# Patient Record
Sex: FEMALE | Race: WHITE | Employment: OTHER | URBAN - METROPOLITAN AREA
[De-identification: names, ages, dates, MRNs, and addresses within clinical notes are randomized per-mention and may not be internally consistent; named-entity substitution may affect disease eponyms.]

---

## 2021-08-30 ENCOUNTER — OFFICE VISIT (OUTPATIENT)
Dept: PRIMARY CARE CLINIC | Age: 71
End: 2021-08-30
Payer: MEDICARE

## 2021-08-30 VITALS
BODY MASS INDEX: 27.36 KG/M2 | DIASTOLIC BLOOD PRESSURE: 82 MMHG | HEIGHT: 63 IN | OXYGEN SATURATION: 96 % | SYSTOLIC BLOOD PRESSURE: 140 MMHG | HEART RATE: 69 BPM | WEIGHT: 154.4 LBS

## 2021-08-30 DIAGNOSIS — I63.9 CEREBROVASCULAR ACCIDENT (CVA), UNSPECIFIED MECHANISM (HCC): ICD-10-CM

## 2021-08-30 DIAGNOSIS — Z76.89 ENCOUNTER TO ESTABLISH CARE: ICD-10-CM

## 2021-08-30 DIAGNOSIS — Z00.00 HEALTH CARE MAINTENANCE: ICD-10-CM

## 2021-08-30 DIAGNOSIS — Z78.0 MENOPAUSE: ICD-10-CM

## 2021-08-30 DIAGNOSIS — E78.5 HYPERLIPIDEMIA, UNSPECIFIED HYPERLIPIDEMIA TYPE: ICD-10-CM

## 2021-08-30 DIAGNOSIS — R94.31 ABNORMAL ELECTROCARDIOGRAM (ECG) (EKG): ICD-10-CM

## 2021-08-30 DIAGNOSIS — Z12.31 ENCOUNTER FOR SCREENING MAMMOGRAM FOR MALIGNANT NEOPLASM OF BREAST: Primary | ICD-10-CM

## 2021-08-30 DIAGNOSIS — R79.9 ABNORMAL FINDING OF BLOOD CHEMISTRY, UNSPECIFIED: ICD-10-CM

## 2021-08-30 DIAGNOSIS — Z12.11 SCREEN FOR COLON CANCER: ICD-10-CM

## 2021-08-30 DIAGNOSIS — M54.2 NECK PAIN: ICD-10-CM

## 2021-08-30 DIAGNOSIS — Z91.81 AT HIGH RISK FOR FALLS: ICD-10-CM

## 2021-08-30 DIAGNOSIS — I71.9 AORTIC ANEURYSM WITHOUT RUPTURE, UNSPECIFIED PORTION OF AORTA (HCC): ICD-10-CM

## 2021-08-30 PROCEDURE — 1090F PRES/ABSN URINE INCON ASSESS: CPT | Performed by: PHYSICIAN ASSISTANT

## 2021-08-30 PROCEDURE — 99204 OFFICE O/P NEW MOD 45 MIN: CPT | Performed by: PHYSICIAN ASSISTANT

## 2021-08-30 PROCEDURE — 3017F COLORECTAL CA SCREEN DOC REV: CPT | Performed by: PHYSICIAN ASSISTANT

## 2021-08-30 PROCEDURE — 1123F ACP DISCUSS/DSCN MKR DOCD: CPT | Performed by: PHYSICIAN ASSISTANT

## 2021-08-30 PROCEDURE — G8400 PT W/DXA NO RESULTS DOC: HCPCS | Performed by: PHYSICIAN ASSISTANT

## 2021-08-30 PROCEDURE — G8417 CALC BMI ABV UP PARAM F/U: HCPCS | Performed by: PHYSICIAN ASSISTANT

## 2021-08-30 PROCEDURE — G8427 DOCREV CUR MEDS BY ELIG CLIN: HCPCS | Performed by: PHYSICIAN ASSISTANT

## 2021-08-30 PROCEDURE — 4040F PNEUMOC VAC/ADMIN/RCVD: CPT | Performed by: PHYSICIAN ASSISTANT

## 2021-08-30 PROCEDURE — 4004F PT TOBACCO SCREEN RCVD TLK: CPT | Performed by: PHYSICIAN ASSISTANT

## 2021-08-30 RX ORDER — LANOLIN ALCOHOL/MO/W.PET/CERES
1000 CREAM (GRAM) TOPICAL DAILY
COMMUNITY

## 2021-08-30 RX ORDER — CYCLOBENZAPRINE HCL 10 MG
10 TABLET ORAL 3 TIMES DAILY PRN
Qty: 30 TABLET | Refills: 1 | Status: SHIPPED | OUTPATIENT
Start: 2021-08-30 | End: 2021-08-31 | Stop reason: SDUPTHER

## 2021-08-30 RX ORDER — AMLODIPINE BESYLATE 5 MG/1
5 TABLET ORAL DAILY
COMMUNITY
End: 2021-10-12 | Stop reason: SDUPTHER

## 2021-08-30 RX ORDER — ATORVASTATIN CALCIUM 10 MG/1
TABLET, FILM COATED ORAL
COMMUNITY
Start: 2021-08-07 | End: 2021-09-24 | Stop reason: ALTCHOICE

## 2021-08-30 RX ORDER — OMEPRAZOLE 40 MG/1
CAPSULE, DELAYED RELEASE ORAL
COMMUNITY
Start: 2021-08-07 | End: 2022-05-20 | Stop reason: SDUPTHER

## 2021-08-30 SDOH — ECONOMIC STABILITY: FOOD INSECURITY: WITHIN THE PAST 12 MONTHS, THE FOOD YOU BOUGHT JUST DIDN'T LAST AND YOU DIDN'T HAVE MONEY TO GET MORE.: NEVER TRUE

## 2021-08-30 SDOH — ECONOMIC STABILITY: FOOD INSECURITY: WITHIN THE PAST 12 MONTHS, YOU WORRIED THAT YOUR FOOD WOULD RUN OUT BEFORE YOU GOT MONEY TO BUY MORE.: NEVER TRUE

## 2021-08-30 ASSESSMENT — ENCOUNTER SYMPTOMS
CHEST TIGHTNESS: 0
SORE THROAT: 0
ABDOMINAL PAIN: 0
COUGH: 0
EYE DISCHARGE: 0
DIARRHEA: 0
CONSTIPATION: 0
PHOTOPHOBIA: 0
ABDOMINAL DISTENTION: 0
RHINORRHEA: 0
SINUS PRESSURE: 0
SHORTNESS OF BREATH: 0
VOMITING: 0

## 2021-08-30 ASSESSMENT — SOCIAL DETERMINANTS OF HEALTH (SDOH): HOW HARD IS IT FOR YOU TO PAY FOR THE VERY BASICS LIKE FOOD, HOUSING, MEDICAL CARE, AND HEATING?: NOT HARD AT ALL

## 2021-08-30 ASSESSMENT — PATIENT HEALTH QUESTIONNAIRE - PHQ9
SUM OF ALL RESPONSES TO PHQ QUESTIONS 1-9: 0
SUM OF ALL RESPONSES TO PHQ QUESTIONS 1-9: 0
2. FEELING DOWN, DEPRESSED OR HOPELESS: 0
SUM OF ALL RESPONSES TO PHQ QUESTIONS 1-9: 0
SUM OF ALL RESPONSES TO PHQ9 QUESTIONS 1 & 2: 0
1. LITTLE INTEREST OR PLEASURE IN DOING THINGS: 0

## 2021-08-30 NOTE — PROGRESS NOTES
On the basis of positive falls risk screening, assessment and plan is as follows: home safety tips provided       [address] [address]  Dept: [phone]    Yajaira Kohli is a 80 y.o. female New patient, who presents today for her medical conditions/complaints as noted below. Chief Complaint   Patient presents with    New Patient     get established        HPI:     HPI: The patient is having a stiff neck. She can feel muscles tighten. Has tried Bengay heating pad and pain cream.     Had kidney cancer. Had uterus cancer. No oncologist. The patient did not need chemo. Kidney was on 11-8-05 hysterectomy in 1979. Patient has chronic back pain. Handling it on her. Reviewed prior notes Cardiology  Reviewed previous Labs and Imaging    No results found for: LDLCHOLESTEROL, LDLCALC    (goal LDL is <100)   AST (U/L)   Date Value   01/09/2014 21     ALT (U/L)   Date Value   01/09/2014 13     BUN (mg/dL)   Date Value   01/11/2014 12     BP Readings from Last 3 Encounters:   08/30/21 (!) 140/82   01/30/14 142/89   11/08/13 135/79          (goal 120/80)    Past Medical History:   Diagnosis Date    Arthritic-like pain     Arthritis     CAD (coronary artery disease)     Cancer (HCC)     uterus,lt kidney    Chest pain 1/8/14    Chronic kidney disease     DVT (deep venous thrombosis) (HCC)     Rt. upper arm    Full dentures     GERD (gastroesophageal reflux disease)     Lazy eye     Rt.     Osteoporosis     Peptic ulcer     PFO (patent foramen ovale) 1/10/14    closure of PFO    Stroke syndrome     x4 sept 9/13 last one    TIA (transient ischemic attack)     Wears glasses       Past Surgical History:   Procedure Laterality Date    BACK SURGERY      fussion L5    CARDIAC CATHETERIZATION      CARPAL TUNNEL RELEASE      HAND SURGERY Right     thumb rt    HYSTERECTOMY      PARTIAL NEPHRECTOMY      SPLENECTOMY, TOTAL  THORACOTOMY  1/10/14     min. Inv. PFO closure       Family History   Problem Relation Age of Onset    Diabetes Mother     Asthma Mother     Heart Failure Mother         MI    Heart Failure Father         pacemaker    Rheum Arthritis Father     Diabetes Sister     Diabetes Sister     Diabetes Brother     Cancer Sister         hyst    Heart Failure Brother         MI       Social History     Tobacco Use    Smoking status: Current Every Day Smoker     Packs/day: 1.00     Years: 48.00     Pack years: 48.00     Types: Cigarettes    Smokeless tobacco: Never Used    Tobacco comment: trying to quit, down to 3-4 cig./day   Substance Use Topics    Alcohol use: No      Current Outpatient Medications   Medication Sig Dispense Refill    atorvastatin (LIPITOR) 10 MG tablet       omeprazole (PRILOSEC) 40 MG delayed release capsule       amLODIPine (NORVASC) 5 MG tablet Take 5 mg by mouth daily      vitamin B-12 (CYANOCOBALAMIN) 1000 MCG tablet Take 1,000 mcg by mouth daily      Cholecalciferol (VITAMIN D3 PO) Take 1,000 mg by mouth      cyclobenzaprine (FLEXERIL) 10 MG tablet Take 1 tablet by mouth 3 times daily as needed for Muscle spasms 30 tablet 1    clopidogrel (PLAVIX) 75 MG tablet Take 75 mg by mouth daily. No current facility-administered medications for this visit.      Allergies   Allergen Reactions    Adhesive Tape        Health Maintenance   Topic Date Due    Hepatitis C screen  Never done    Meningococcal (ACWY) vaccine (1 - Risk start before 7 months 4-dose series) Never done    Hib vaccine (1 of 1 - Risk 1-dose series) Never done    Lipid screen  Never done    Meningococcal B vaccine (1 of 4 - Increased Risk Bexsero 2-dose series) Never done    DTaP/Tdap/Td vaccine (1 - Tdap) Never done    Diabetes screen  Never done    Colon cancer screen colonoscopy  Never done    Breast cancer screen  Never done    Shingles Vaccine (1 of 2) Never done    Low dose CT lung screening  Never done    DEXA (modify frequency per FRAX score)  Never done    Pneumococcal 65+ yrs at Risk Vaccine (1 of 2 - PCV13) Never done    Flu vaccine (1) 09/01/2021    COVID-19 Vaccine  Completed    Hepatitis A vaccine  Aged Out    Hepatitis B vaccine  Aged Out       Subjective:      Review of Systems   Constitutional: Negative for chills, fever and unexpected weight change. HENT: Negative for congestion, hearing loss, rhinorrhea, sinus pressure and sore throat. Eyes: Negative for photophobia, discharge and visual disturbance. Respiratory: Negative for cough, chest tightness and shortness of breath. Cardiovascular: Negative for chest pain, palpitations and leg swelling. Gastrointestinal: Negative for abdominal distention, abdominal pain, constipation, diarrhea and vomiting. Endocrine: Negative for polydipsia and polyuria. Genitourinary: Negative for decreased urine volume, difficulty urinating, frequency and urgency. Musculoskeletal: Negative for arthralgias, gait problem and myalgias. Skin: Negative for rash. Allergic/Immunologic: Negative for food allergies. Neurological: Negative for dizziness, weakness, numbness and headaches. Hematological: Negative for adenopathy. Psychiatric/Behavioral: Negative for dysphoric mood and sleep disturbance. The patient is not nervous/anxious. Objective:     BP (!) 140/82   Pulse 69   Ht 5' 2.5\" (1.588 m)   Wt 154 lb 6.4 oz (70 kg)   SpO2 96%   BMI 27.79 kg/m²   Physical Exam  Constitutional:       General: She is not in acute distress. Appearance: Normal appearance. She is not ill-appearing. HENT:      Head: Normocephalic and atraumatic. Right Ear: External ear normal.      Left Ear: External ear normal.      Nose: Nose normal.      Mouth/Throat:      Mouth: Mucous membranes are moist.   Eyes:      Extraocular Movements: Extraocular movements intact.       Conjunctiva/sclera: Conjunctivae normal.      Pupils: Pupils are equal, round, and reactive to light. Neck:      Vascular: No carotid bruit. Cardiovascular:      Rate and Rhythm: Normal rate and regular rhythm. Pulses: Normal pulses. Heart sounds: Normal heart sounds. Pulmonary:      Effort: Pulmonary effort is normal. No respiratory distress. Breath sounds: Normal breath sounds. Abdominal:      General: Bowel sounds are normal. There is no distension. Tenderness: There is no abdominal tenderness. Musculoskeletal:         General: Normal range of motion. Cervical back: Normal range of motion and neck supple. Lymphadenopathy:      Cervical: No cervical adenopathy. Skin:     General: Skin is warm and dry. Neurological:      General: No focal deficit present. Mental Status: She is alert and oriented to person, place, and time. Psychiatric:         Mood and Affect: Mood normal.         Behavior: Behavior normal.         Thought Content: Thought content normal.         Assessment and Plan:          1. Encounter for screening mammogram for malignant neoplasm of breast  2. Screen for colon cancer  3. Encounter to establish care  -     Lipid, Fasting; Future  -     Comprehensive Metabolic Panel; Future  4. Health care maintenance  -     Lipid, Fasting; Future  -     Comprehensive Metabolic Panel; Future  -     CBC Auto Differential; Future  5. Menopause  6. At high risk for falls  7. Neck pain  -     cyclobenzaprine (FLEXERIL) 10 MG tablet; Take 1 tablet by mouth 3 times daily as needed for Muscle spasms, Disp-30 tablet, R-1Normal  8. Aortic aneurysm without rupture, unspecified portion of aorta (HCC)  -     Echocardiogram complete; Future  9. Abnormal finding of blood chemistry, unspecified   -     Lipid, Fasting; Future  -     Comprehensive Metabolic Panel; Future  10. Abnormal electrocardiogram (ECG) (EKG)   -     Echocardiogram complete; Future  11. Cerebrovascular accident (CVA), unspecified mechanism ([address]   No vaccines wanted.    No flu shot.   Needs record release from Dr. Doris Mane in . The patient will start baby aspirin. Patient given educational materials - see patient instructions. Discussed use, benefit, and side effects of prescribed medications. All patient questions answered. Pt voiced understanding. Reviewed health maintenance. Instructed to continue current medications, diet and exercise. Patient agreed with treatment plan. Follow up as directed.      Electronically signed by DAPHNIE Monson on 8/30/2021 at 10:50 AM  .

## 2021-08-31 ENCOUNTER — TELEPHONE (OUTPATIENT)
Dept: PRIMARY CARE CLINIC | Age: 71
End: 2021-08-31

## 2021-08-31 DIAGNOSIS — M54.2 NECK PAIN: ICD-10-CM

## 2021-08-31 RX ORDER — CYCLOBENZAPRINE HCL 10 MG
10 TABLET ORAL 3 TIMES DAILY PRN
Qty: 30 TABLET | Refills: 1 | Status: SHIPPED | OUTPATIENT
Start: 2021-08-31 | End: 2021-10-12 | Stop reason: SDUPTHER

## 2021-08-31 NOTE — TELEPHONE ENCOUNTER
----- Message from Roel Blum sent at 8/31/2021  8:17 AM EDT -----  Subject: Medication Problem    QUESTIONS  Name of Medication? cyclobenzaprine (FLEXERIL) 10 MG tablet  Patient-reported dosage and instructions? Take 1 tablet by mouth 3 times   daily as needed for Muscle spasms  What question or problem do you have with the medication? Patient says   these never arrived at her pharmacy after her appointment on 8/30/2021. Preferred Pharmacy? [facility], [address] [facility] [phone]  Pharmacy phone number (if available)? [phone]  Additional Information for Provider?   ---------------------------------------------------------------------------  --------------  [address]  What is the best way for the office to contact you? OK to leave message on   voicemail  Preferred Call Back Phone Number? [phone]  ---------------------------------------------------------------------------  --------------  SCRIPT ANSWERS  Relationship to Patient?  Self

## 2021-09-17 ENCOUNTER — PROCEDURE VISIT (OUTPATIENT)
Dept: PRIMARY CARE CLINIC | Age: 71
End: 2021-09-17
Payer: MEDICARE

## 2021-09-17 ENCOUNTER — TELEPHONE (OUTPATIENT)
Dept: PRIMARY CARE CLINIC | Age: 71
End: 2021-09-17

## 2021-09-17 VITALS
SYSTOLIC BLOOD PRESSURE: 142 MMHG | HEIGHT: 63 IN | WEIGHT: 158 LBS | DIASTOLIC BLOOD PRESSURE: 80 MMHG | BODY MASS INDEX: 28 KG/M2 | HEART RATE: 64 BPM | OXYGEN SATURATION: 96 %

## 2021-09-17 DIAGNOSIS — D48.5 NEOPLASM OF UNCERTAIN BEHAVIOR OF SKIN: ICD-10-CM

## 2021-09-17 DIAGNOSIS — D18.01 CHERRY ANGIOMA: ICD-10-CM

## 2021-09-17 DIAGNOSIS — L81.4 LENTIGINES: ICD-10-CM

## 2021-09-17 DIAGNOSIS — L82.1 SEBORRHEIC KERATOSES: ICD-10-CM

## 2021-09-17 DIAGNOSIS — I78.1 TELANGIECTASIAS: ICD-10-CM

## 2021-09-17 DIAGNOSIS — M54.2 NECK PAIN: Primary | ICD-10-CM

## 2021-09-17 PROCEDURE — 3017F COLORECTAL CA SCREEN DOC REV: CPT | Performed by: PHYSICIAN ASSISTANT

## 2021-09-17 PROCEDURE — 4004F PT TOBACCO SCREEN RCVD TLK: CPT | Performed by: PHYSICIAN ASSISTANT

## 2021-09-17 PROCEDURE — 1090F PRES/ABSN URINE INCON ASSESS: CPT | Performed by: PHYSICIAN ASSISTANT

## 2021-09-17 PROCEDURE — 1123F ACP DISCUSS/DSCN MKR DOCD: CPT | Performed by: PHYSICIAN ASSISTANT

## 2021-09-17 PROCEDURE — 4040F PNEUMOC VAC/ADMIN/RCVD: CPT | Performed by: PHYSICIAN ASSISTANT

## 2021-09-17 PROCEDURE — G8400 PT W/DXA NO RESULTS DOC: HCPCS | Performed by: PHYSICIAN ASSISTANT

## 2021-09-17 PROCEDURE — G8427 DOCREV CUR MEDS BY ELIG CLIN: HCPCS | Performed by: PHYSICIAN ASSISTANT

## 2021-09-17 PROCEDURE — 99213 OFFICE O/P EST LOW 20 MIN: CPT | Performed by: PHYSICIAN ASSISTANT

## 2021-09-17 PROCEDURE — G8417 CALC BMI ABV UP PARAM F/U: HCPCS | Performed by: PHYSICIAN ASSISTANT

## 2021-09-17 NOTE — PROGRESS NOTES
[facility]  [address]  Phone: [phone]  Fax: [phone]    Marry Da Silva is a 80 y.o. female who presents today for her Complete Skin Check. Personal history ofskin cancer:  No  Lesion on forehead was removed in past--once a year she gets a sticky white substance out of it. Family history of skin cancer? Unsure   Any Concerning Lesions? Back and chest  Do you wear Sunscreen:  Yes, except on back  Do you work or play outsideregularly:  Yes    BP (!) 142/82   Pulse 64   Ht 5' 2.5\" (1.588 m)   Wt 158 lb (71.7 kg)   SpO2 96%   BMI 28.44 kg/m²      Physical Exam  Face:  Did not take mask off. Pearly irregular lesion appx 1 cm on central forehead. Chest:  Chest appears with several Millicent, SKs, and sun damage with telangectasias across decollette. Has a irritated appx 1 cm hyperkeratotic lesion on left breast   Back:  Back appears with a very irritated lesion on left mid back. Also has several SKs, lentigines, and Millicent. Two tattoos on each posterior shoulder. Diagnosis Orders   1. Neoplasm of uncertain behavior of skin     2. Seborrheic keratoses     3. Cherry angioma     4. Lentigines     5. Telangiectasias         Plan:  Recheck BP  Discussed good skin care. Discussed proper sun Protection. Patient education given on skin cancer and precancers. RTO on Friday for 3 shave biopsies. Return for Friday for 3 shave biopsies.     [facility]

## 2021-09-17 NOTE — TELEPHONE ENCOUNTER
Patient c/o neck pain more on the left side, and painful when looking down. Pt said she she brought this up at her last appt on 8/30/21 and thought a XR was going to be ordered. Patient asked if she could have a Xray of her neck sent to [facility].  Please advise

## 2021-09-24 DIAGNOSIS — I71.9 AORTIC ANEURYSM WITHOUT RUPTURE, UNSPECIFIED PORTION OF AORTA (HCC): ICD-10-CM

## 2021-09-24 DIAGNOSIS — R94.31 ABNORMAL ELECTROCARDIOGRAM (ECG) (EKG): ICD-10-CM

## 2021-09-24 RX ORDER — ATORVASTATIN CALCIUM 20 MG/1
20 TABLET, FILM COATED ORAL DAILY
Qty: 90 TABLET | Refills: 1 | Status: SHIPPED | OUTPATIENT
Start: 2021-09-24 | End: 2022-04-25 | Stop reason: SDUPTHER

## 2021-09-28 ENCOUNTER — TELEPHONE (OUTPATIENT)
Dept: PRIMARY CARE CLINIC | Age: 71
End: 2021-09-28

## 2021-09-28 DIAGNOSIS — M54.12 CERVICAL RADICULOPATHY: Primary | ICD-10-CM

## 2021-09-28 NOTE — TELEPHONE ENCOUNTER
Pt was notified already about echo. MRI order faxed to [facility] per her request. Pt states she is very claustrophobic and will need something to help calm her such as valium. She said in past it helped and she was able to get test completed. She will call back once she is scheduled to have meds called in.

## 2021-09-28 NOTE — TELEPHONE ENCOUNTER
Document  RE: Sha Sinhama   Sent: Tue September 28, 2021  1:54 PM   To: Larisa Rubalcava          Message    Please advise patient I have sent an MRI for her to get done of her neck due to XR showing arthritic changes.     ----- Message -----   From: Kaylee Perez MA   Sent: 9/28/2021   1:40 PM EDT   To: DAPHNIE Bui   Subject: Edit

## 2021-09-29 ENCOUNTER — TELEPHONE (OUTPATIENT)
Dept: PRIMARY CARE CLINIC | Age: 71
End: 2021-09-29

## 2021-09-29 DIAGNOSIS — F41.9 ANXIETY: Primary | ICD-10-CM

## 2021-09-29 RX ORDER — LORAZEPAM 0.5 MG/1
0.5 TABLET ORAL
Qty: 1 TABLET | Refills: 0 | Status: SHIPPED | OUTPATIENT
Start: 2021-09-29 | End: 2021-09-29

## 2021-09-29 NOTE — TELEPHONE ENCOUNTER
Pt has MRI scheduled 10/5 and is asking for an antianxiety med d/t claustrophobia.  [facility] [place]

## 2021-10-01 ENCOUNTER — HOSPITAL ENCOUNTER (OUTPATIENT)
Age: 71
Setting detail: SPECIMEN
Discharge: HOME | End: 2021-10-01
Payer: MEDICARE

## 2021-10-01 ENCOUNTER — PROCEDURE VISIT (OUTPATIENT)
Dept: PRIMARY CARE CLINIC | Age: 71
End: 2021-10-01
Payer: MEDICARE

## 2021-10-01 VITALS
HEIGHT: 63 IN | SYSTOLIC BLOOD PRESSURE: 138 MMHG | WEIGHT: 155 LBS | HEART RATE: 62 BPM | OXYGEN SATURATION: 98 % | DIASTOLIC BLOOD PRESSURE: 82 MMHG | BODY MASS INDEX: 27.46 KG/M2

## 2021-10-01 DIAGNOSIS — D48.5 NEOPLASM OF UNCERTAIN BEHAVIOR OF SKIN: Primary | ICD-10-CM

## 2021-10-01 PROCEDURE — 11103 TANGNTL BX SKIN EA SEP/ADDL: CPT | Performed by: PHYSICIAN ASSISTANT

## 2021-10-01 PROCEDURE — 11102 TANGNTL BX SKIN SINGLE LES: CPT | Performed by: PHYSICIAN ASSISTANT

## 2021-10-01 NOTE — PROGRESS NOTES
Skin Biopsy Procedure Note  10/1/2021  Giovanny Swift  1950    /82   Pulse 62   Ht 5' 2.5\" (1.588 m)   Wt 155 lb (70.3 kg)   SpO2 98%   BMI 27.90 kg/m²   VITALS REVIEWED    Allergies   Allergen Reactions    Adhesive Tape        Chief Complaint   Patient presents with    Procedure     shave bx on back       Lesion:  · 1. Central forehead  · 2. Left back       Indication for Biopsy 1: non healing lesion  Indication for Biopsy 2: non healing lesion      Procedure: The lesion(s) were cleansed with Alcohol.  2% lidocaine with epinephrine was used for local anaesthesia. 1. A 10 mm  shave was used to obtain a specimen. A 1 cm shave was used. The specimen(s) were    preserved and sent for pathological examination. The biopsy site(s) were  cleansed and hemostasis using ACl and silver nitrate for forehead as well as  pressure dressing(s) were achieved with good results. The patient was instructed on wound care. No complications occurred and the patient tolerated the procedure well. Diagnosis Orders   1. Neoplasm of uncertain behavior of skin  Derm biopsy    Derm biopsy       Follow Up: Wound care discussed. Keep well moisturized. Watch for signs of infection which would include:  Redness, swelling, fever. If signs appear, please return to office. Return for Will call with results.        Electronically signed by Faustino Choudhury PA-C on 10/1/2021 at 2:46 PM

## 2021-10-04 ENCOUNTER — TELEPHONE (OUTPATIENT)
Dept: PRIMARY CARE CLINIC | Age: 71
End: 2021-10-04

## 2021-10-04 DIAGNOSIS — M47.812 CERVICAL ARTHRITIS: Primary | ICD-10-CM

## 2021-10-04 RX ORDER — LORAZEPAM 1 MG/1
1 TABLET ORAL ONCE
Qty: 1 TABLET | Refills: 0 | Status: SHIPPED | OUTPATIENT
Start: 2021-10-04 | End: 2021-10-04

## 2021-10-04 NOTE — TELEPHONE ENCOUNTER
Pt said that you were going to send in something for her to [place] in [place]., to get her through her MRI.  It is scheduled for tomorrow at 9 am.

## 2021-10-04 NOTE — TELEPHONE ENCOUNTER
You did a shave bx on pt on Friday. Pt is on coumadin and just after leaving appt, she soaked through the band aid. Had to go to the er that night due to bleeding so much. Pt said that she soaked through a towel. Pt stooped her coumadin on Sat and has changed her dressing several times. Afraid to change it due to all the bleeding. Please advise.

## 2021-10-06 LAB — DERMATOLOGY PATHOLOGY REPORT: NORMAL

## 2021-10-07 ENCOUNTER — TELEPHONE (OUTPATIENT)
Dept: PRIMARY CARE CLINIC | Age: 71
End: 2021-10-07

## 2021-10-07 NOTE — TELEPHONE ENCOUNTER
Patient states she had her MRI done at [facility] called [facility] and requested results to be faxed to office

## 2021-10-12 DIAGNOSIS — M54.2 NECK PAIN: ICD-10-CM

## 2021-10-12 RX ORDER — CYCLOBENZAPRINE HCL 10 MG
10 TABLET ORAL 3 TIMES DAILY PRN
Qty: 30 TABLET | Refills: 1 | Status: SHIPPED | OUTPATIENT
Start: 2021-10-12 | End: 2021-12-27

## 2021-10-12 RX ORDER — AMLODIPINE BESYLATE 5 MG/1
5 TABLET ORAL DAILY
Qty: 30 TABLET | Refills: 2 | Status: SHIPPED | OUTPATIENT
Start: 2021-10-12 | End: 2021-10-13 | Stop reason: SDUPTHER

## 2021-10-12 NOTE — TELEPHONE ENCOUNTER
----- Message from Santa Waldemarlogan sent at 10/12/2021 12:17 PM EDT -----  Subject: Refill Request    QUESTIONS  Name of Medication? amLODIPine (NORVASC) 5 MG tablet  Patient-reported dosage and instructions? 5 MG once a day   How many days do you have left? 14  Preferred Pharmacy? [address] [facility] phone number (if available)? [phone]  ---------------------------------------------------------------------------  --------------,  Name of Medication? cyclobenzaprine (FLEXERIL) 10 MG tablet  Patient-reported dosage and instructions? 10 mg as needed for spasms  How many days do you have left? 14  Preferred Pharmacy? [facility] [facility] phone number (if available)? [phone]  ---------------------------------------------------------------------------  --------------  CALL BACK INFO  What is the best way for the office to contact you? OK to leave message on   voicemail  Preferred Call Back Phone Number?  [phone]

## 2021-10-13 ENCOUNTER — TELEPHONE (OUTPATIENT)
Dept: PRIMARY CARE CLINIC | Age: 71
End: 2021-10-13

## 2021-10-13 DIAGNOSIS — M54.12 CERVICAL RADICULOPATHY: ICD-10-CM

## 2021-10-13 RX ORDER — AMLODIPINE BESYLATE 5 MG/1
5 TABLET ORAL DAILY
Qty: 90 TABLET | Refills: 3 | Status: SHIPPED | OUTPATIENT
Start: 2021-10-13 | End: 2022-04-25 | Stop reason: SDUPTHER

## 2021-10-13 NOTE — RESULT ENCOUNTER NOTE
Call patient and advise that test results were okay mild degenerative changes no problems with spinal cord.

## 2021-10-13 NOTE — TELEPHONE ENCOUNTER
Pt calling for her MRI results. I gave results to Speedy Iniguez to scan in. Please review and advise. Thankyou!

## 2021-10-21 DIAGNOSIS — C44.91 BASAL CELL CARCINOMA (BCC), UNSPECIFIED SITE: Primary | ICD-10-CM

## 2021-10-27 ENCOUNTER — OFFICE VISIT (OUTPATIENT)
Dept: SURGERY | Age: 71
End: 2021-10-27
Payer: MEDICARE

## 2021-10-27 VITALS — WEIGHT: 155 LBS | BODY MASS INDEX: 27.46 KG/M2 | RESPIRATION RATE: 12 BRPM | HEIGHT: 63 IN

## 2021-10-27 DIAGNOSIS — C44.319 BASAL CELL CARCINOMA (BCC) OF SKIN OF OTHER PART OF FACE: Primary | ICD-10-CM

## 2021-10-27 PROCEDURE — 4040F PNEUMOC VAC/ADMIN/RCVD: CPT | Performed by: PLASTIC SURGERY

## 2021-10-27 PROCEDURE — 99203 OFFICE O/P NEW LOW 30 MIN: CPT | Performed by: PLASTIC SURGERY

## 2021-10-27 PROCEDURE — G8427 DOCREV CUR MEDS BY ELIG CLIN: HCPCS | Performed by: PLASTIC SURGERY

## 2021-10-27 PROCEDURE — G8484 FLU IMMUNIZE NO ADMIN: HCPCS | Performed by: PLASTIC SURGERY

## 2021-10-27 PROCEDURE — G8417 CALC BMI ABV UP PARAM F/U: HCPCS | Performed by: PLASTIC SURGERY

## 2021-10-27 PROCEDURE — 1123F ACP DISCUSS/DSCN MKR DOCD: CPT | Performed by: PLASTIC SURGERY

## 2021-10-27 PROCEDURE — 4004F PT TOBACCO SCREEN RCVD TLK: CPT | Performed by: PLASTIC SURGERY

## 2021-10-27 PROCEDURE — G8400 PT W/DXA NO RESULTS DOC: HCPCS | Performed by: PLASTIC SURGERY

## 2021-10-27 PROCEDURE — 1090F PRES/ABSN URINE INCON ASSESS: CPT | Performed by: PLASTIC SURGERY

## 2021-10-27 PROCEDURE — 3017F COLORECTAL CA SCREEN DOC REV: CPT | Performed by: PLASTIC SURGERY

## 2021-10-27 NOTE — LETTER
[facility]  [address] [facility] [address]  Phone: [phone]  Fax: [phone]           Omkar Sanchez MD      October 27, 2021     Patient: Rashi Brown   MR Number: [id number]   YOB: 1950   Date of Visit: 10/27/2021       Dear Dr. Radha Georges: Thank you for referring Earnesteen Else to me for evaluation/treatment. Below are the relevant portions of my assessment and plan of care. Plan:  Patient with biopsy-proven basal cell carcinoma of the forehead. We discussed excision with frozen section. We also discussed possible skin graft and possible flap reconstruction. Patient is on Plavix. We will need to get clearance prior to any surgical intervention. If you have questions, please do not hesitate to call me. I look forward to following Aracelis Carrasquillo along with you.     Sincerely,        Omkar Sanchez MD    CC providers:  Amarilys Hubbard, [address] [place] [id number]  Via In Basket

## 2021-10-27 NOTE — PROGRESS NOTES
[address] [place] [address] SURGICAL SPECIALISTS  [facility] [id number]       History and Physical     Chief Complaint   Patient presents with    Basal Cell Carcinoma     forehead, referred by Rogerio Rodriguez        HPI:   Latoya Orellana is a 80 y.o. female who presents with biopsy-proven basal cell carcinoma of the forehead. Patient is here to discuss her options. Patient is on Plavix. .  Medications:     Current Outpatient Medications   Medication Sig Dispense Refill    amLODIPine (NORVASC) 5 MG tablet Take 1 tablet by mouth daily 90 tablet 3    cyclobenzaprine (FLEXERIL) 10 MG tablet Take 1 tablet by mouth 3 times daily as needed for Muscle spasms 30 tablet 1    atorvastatin (LIPITOR) 20 MG tablet Take 1 tablet by mouth daily 90 tablet 1    omeprazole (PRILOSEC) 40 MG delayed release capsule       vitamin B-12 (CYANOCOBALAMIN) 1000 MCG tablet Take 1,000 mcg by mouth daily      Cholecalciferol (VITAMIN D3 PO) Take 1,000 mg by mouth      clopidogrel (PLAVIX) 75 MG tablet Take 75 mg by mouth daily. No current facility-administered medications for this visit. Allergies: Allergies   Allergen Reactions    Adhesive Tape      Review of Systems:   Constitutional: Negative for fever, chills, fatigue and unexpected weight change. HENT: Negative for hearing loss, sore throat and facial swelling. Eyes: Negative for pain and discharge. Respiratory: Negative for cough and shortness of breath. Cardiovascular: Patient has coronary artery disease, and DVT patient with patent avina ovale. Gastrointestinal: Patient has a history of GERD. Skin: Negative for pallor and rash. Neurological: Patient has stroke syndrome. Patient with a history of TIA. Sandra Red Hematological: Does not bruise/bleed easily. Psychiatric/Behavioral: Negative for behavioral problems. The patient is not nervous/anxious. : Patient with kidney disease.   Musculoskeletal: Patient with arthritis. Past Medical History:   Diagnosis Date    Arthritic-like pain     Arthritis     CAD (coronary artery disease)     Cancer ([facility] [address]     uterus,lt kidney    Chest pain 1/8/14    Chronic kidney disease     DVT (deep venous thrombosis) ([facility])     Rt. upper arm    Full dentures     GERD (gastroesophageal reflux disease)     Lazy eye     Rt.  Osteoporosis     Peptic ulcer     PFO (patent foramen ovale) 1/10/14    closure of PFO    Stroke syndrome     x4 sept 9/13 last one    TIA (transient ischemic attack)     Wears glasses      Past Surgical History:   Procedure Laterality Date    BACK SURGERY      fussion L5    CARDIAC CATHETERIZATION      CARPAL TUNNEL RELEASE      HAND SURGERY Right     thumb rt    HYSTERECTOMY      PARTIAL NEPHRECTOMY      SPLENECTOMY, TOTAL      THORACOTOMY  1/10/14     min. Inv. PFO closure     Social History     Socioeconomic History    Marital status:      Spouse name: Not on file    Number of children: Not on file    Years of education: Not on file    Highest education level: Not on file   Occupational History    Not on file   Tobacco Use    Smoking status: Current Every Day Smoker     Packs/day: 1.00     Years: 48.00     Pack years: 48.00     Types: Cigarettes    Smokeless tobacco: Never Used    Tobacco comment: trying to quit, down to 3-4 cig./day   Vaping Use    Vaping Use: Never assessed   Substance and Sexual Activity    Alcohol use: No    Drug use: No    Sexual activity: Not on file   Other Topics Concern    Not on file   Social History Narrative    Not on file     Social Determinants of Health     Financial Resource Strain: Low Risk     Difficulty of Paying Living Expenses: Not hard at all   Food Insecurity: No Food Insecurity    Worried About Running Out of Food in the Last Year: Never true    Topher of Food in the Last Year: Never true   Transportation Needs:     Lack of Transportation (Medical):      Lack of Transportation (Non-Medical):    Physical Activity:     Days of Exercise per Week:     Minutes of Exercise per Session:    Stress:     Feeling of Stress :    Social Connections:     Frequency of Communication with Friends and Family:     Frequency of Social Gatherings with Friends and Family:     Attends Orthodoxy Services:     Active Member of Clubs or Organizations:     Attends Club or Organization Meetings:     Marital Status:    Intimate Partner Violence:     Fear of Current or Ex-Partner:     Emotionally Abused:     Physically Abused:     Sexually Abused:      Family History   Problem Relation Age of Onset    Diabetes Mother     Asthma Mother     Heart Failure Mother         MI    Heart Failure Father         pacemaker    Rheum Arthritis Father     Diabetes Sister     Diabetes Sister     Diabetes Brother     Cancer Sister         hyst    Heart Failure Brother         MI     Physical Exam:   Resp 12   Ht 5' 2.5\" (1.588 m)   Wt 155 lb (70.3 kg)   BMI 27.90 kg/m²    Body mass index is 27.9 kg/m². Physical Exam   Nursing note and vitals reviewed. Constitutional: Oriented to person, place, and time. Appears well-developed and well-nourished. No distress. Head: Normocephalic and atraumatic. Eyes: Conjunctivae and EOM are normal.   Pulmonary/Chest: Effort normal. No respiratory distress. Neurological: Alert and oriented to person, place, and time. Skin:   Patient with biopsy-proven basal cell carcinoma of the forehead. Jacklyn Segundo Psychiatric: Normal mood and affect. Behavior is normal    Laboratory:   10/6/2021  2:45 PM - Mouna Zaragoza Incoming Lab Results From [facility]   Dermatology Pathology Report 10/01/2021  9:46  [place]    -- Diagnosis --   1.  SKIN, CENTRAL FOREHEAD, SHAVE BIOPSY:        -  NODULAR BASAL CELL CARCINOMA.      -  THE LESION EXTENDS TO THE DEEP MARGIN.      2.  SKIN, LEFT BACK, SHAVE BIOPSY:        -  IRRITATED AND INFLAMED SEBORRHEIC clearance prior to any surgical intervention. The following information was discussed with the patient, but this is not an all-inclusive-other issue were also discussed with patient. GENERAL INFORMATION  The surgical removal of skin lesions and tumors is frequently performed by plastic surgeons. Certain skin lesions and skin tumors will not disappear spontaneously, surgical removal is a treatment option. There are many different techniques for removing skin lesions and skin tumors. ALTERNATIVE TREATMENTS  Alternative forms of management consist of not treating the skin lesion/skin tumor condition. Removal of skin lesions and some superficial skin tumors may be accomplished by other treatments including the use of liquid nitrogen (freezing), lasers, topical medications, and electric cautery. Risks and potential complications are associated with alternative forms of treatment. Deeper tumors cannot be treated by this. RISKS OF SKIN LESION/SKIN TUMOR SURGERY    I have explained to the patient that every surgical procedure involves a certain amount of risk and it is important that an understanding of these risks and the possible complications associated with them is understood. In addition, every procedure has limitations. An individual's choice to undergo a surgical procedure is based on the comparison of the risk to potential benefit. Although some of patients do not experience these complications. Bleeding- It is possible, to experience a bleeding episode during or after surgery. Intraoperative blood transfusions may be required. Should post-operative bleeding occur, it may require an emergency treatment to drain the accumulated blood or blood transfusion. Do not take any aspirin or anti-inflammatory medications for ten days before or after surgery, as this may increase the risk of bleeding. Non-prescription herbs and dietary supplements can increase the risk of surgical bleeding. Hematoma can occur at any time following injury. If blood transfusions are necessary to treat blood loss, there is the risk of blood-related infections such as hepatitis and HIV (AIDS). Heparin medications that are used to prevent blood clots in veins can produce bleeding and decreased blood platelets. Please check with the physician who prescribed that blood thinners such as aspirin Coumadin etc. Prior to stopping them. Infection- Infection can occur after surgery. Should an infection occur, additional treatment including antibiotics, hospitalization, or additional surgery may be necessary. Scarring- All surgery leaves scars, some more visible than others. Although wound healing after a surgical procedure is expected, abnormal scars may occur within the skin and deeper tissues. Scars may be unattractive and of different color than the surrounding skin tone. Scar appearance may also vary within the same scar. There is the possibility of visible marks from sutures used to close the wound after the removal of skin lesions and tumors. There is the possibility that scars may limit motion and function. In some cases, scars may require surgical revision or treatment. Obesity: If you're BMI is greater than 30 you may have a higher chance of complications. This may include but not limited to wound healing and infections. Also, if you have other medical problems such as diabetes and hypertension it may affect your healing as well as your surgical results in bleeding. Damage to Deeper Structures- There is the potential for injury to deeper structures including nerves, blood vessels, muscles, and lungs (pneumothorax) during any surgical procedure. The potential for this to occur varies according to where on the body surgery is being performed. Injury to deeper structures may be temporary or permanent.   Cancer- In some situations, a skin lesion or tumor that appears to be benign may be determined to be materials and glues, blood products, topical preparations or injected agents have been reported. Serious systemic reactions including shock (anaphylaxis) may occur to drugs used during surgery and prescription medications. Allergic reactions may require additional treatment. Surgical Anesthesia- Both local and general anesthesia involve risk. There is the possibility of complications, injury, and even death from all forms of surgical anesthesia or sedation. Change in Skin Sensation- It is common to experience diminished (or loss) of skin sensation in areas that have had surgery. Diminished (or complete loss of skin sensation) may not totally resolve. Shock- In rare circumstances, your surgical procedure can cause severe trauma, particularly when multiple or extensive procedures are performed. Although serious complications are infrequent, infections or excessive fluid loss can lead to severe illness and even death. If surgical shock occurs, hospitalization and additional treatment would be necessary. Unsatisfactory Result- There is no guarantee or warranty expressed or implied, on the results that may be obtained. There is the possibility of a poor result from the removal of skin lesions and tumors. This would include risks such as unacceptable visible deformities, loss of function, poor healing, wound disruption, skin death and loss of sensation. You may be disappointed with the results of reconstructive surgery. It may be necessary to perform additional surgery to improve your results. Cardiac and Pulmonary Complications- Surgery, especially longer procedures, may be associated with the formation of, or increase in, blood clots in the venous system. Pulmonary complications may occur secondarily to both blood clots (pulmonary emboli), fat deposits (fat emboli) or partial collapse of the lungs after general anesthesia.   Pulmonary and fat emboli can be life-threatening or fatal in some circumstances. Air travel, inactivity and other conditions may increase the incidence of blood clots traveling to the lungs causing a major blood clot that may result in death. It is important to discuss with your physician any past history of blood clots or swollen legs that may contribute to this condition. Cardiac complications are a risk with any surgery and anesthesia, even in patients without symptoms. If you experience shortness of breath, chest pains, or unusual heart beats, seek medical attention immediately. Should any of these complications occur, you may require hospitalization and additional treatment. Smoking, Second-Hand Smoke Exposure, Nicotine Products (Patch, Gum, Nasal Spray)-   Patients who are currently smoking, use tobacco products, or nicotine products (patch, gum, or nasal spray) are at a greater risk for significant surgical complications of skin dying, delayed healing, and additional scarring. Individuals exposed to second-hand smoke are also at potential risk for similar complications attributable to nicotine exposure. Additionally, smoking may have a significant negative effect on anesthesia and recovery from anesthesia, with coughing and possibly increased bleeding. Individuals who are not exposed to tobacco smoke or nicotine-containing products have a significantly lower risk of this type of complication. It is important to refrain from smoking at least 8-week weeks before and after surgery. This may apply to secondhand smoking. Mental Health Disorders and Surgery- It is important that all patients seeking to undergo surgery have realistic expectations that focus on improvement rather than perfection. Complications or less than satisfactory results are sometimes unavoidable, may require additional surgery and often are stressful.   Please openly discuss with your surgeon, prior to surgery, any history that you may have of significant emotional depression or mental health disorders. Although many individuals may benefit psychologically from the results of surgery, effects on mental health cannot be accurately predicted. Medications- There are many adverse reactions that occur as the result of taking over the counter, herbal, and/or prescription medications. Be sure to check with your physician about any drug interactions that may exist with medications which you are already taking. If you have an adverse reaction, stop the drugs immediately and call your plastic surgeon for further instructions. If the reaction is severe, go immediately to the nearest emergency room. When taking the prescribed pain medications after surgery, realize that they can affect your thought process and coordination. Do not drive, do not operate complex equipment, do not make any important decisions, and do not drink any alcohol while taking these medications. Be sure to take your prescribed medication only as directed. ADDITIONAL SURGERY NECESSARY  Should complications occur, additional surgery or other treatments may be necessary. Even though risks and complications occur infrequently, the risks cited are the ones that are particularly associated with skin lesion and skin tumor removal.  Other complications and risks can occur but are even more uncommon. The practice of medicine and surgery is not an exact science. Although good results are expected, there is no guarantee or warranty expressed or implied, on the results that may be obtained. PATIENT COMPLIANCE   Follow all physician instructions carefully; this is essential for the success of your outcome. It is important that the surgical incisions are not subjected to excessive force, swelling, abrasion, or motion during the time of healing. Protective dressings and drains should not be removed unless instructed by your plastic surgeon. Successful post-operative function depends on both surgery and subsequent care.   Physical activity that increases your pulse or heart rate may cause bruising, swelling, fluid accumulation and the need for return to surgery. It is wise to refrain from intimate physical activities after surgery until your physician states it is safe. It is important that you participate in follow-up care, return for aftercare, and promote your recovery after surgery. HEALTH INSURANCE  Most health insurance companies exclude coverage for cosmetic surgical operations or any complications that might occur from surgery. Please carefully review your health insurance subscriber information pamphlet. Most insurance plans exclude coverage for secondary or revisionary surgery. Your type of surgery will most likely be covered by your insurance company, I went there is no guarantees or warrantees implied or otherwise. The cost of surgery involves several charges for the services provided. The total includes fees charged by your surgeon, the cost of surgical supplies, anesthesia, laboratory tests, and possible outpatient hospital charges, depending on where the surgery is performed. Depending on whether the cost of surgery is covered by an insurance plan, you will be responsible for necessary co-payments, deductibles, and charges not covered. The fees charged for this procedure do not include any potential future costs for additional procedures that you elect to have or require in order to revise, optimize, or complete your outcome. Additional costs may occur should complications develop from the surgery. Secondary surgery or hospital day-surgery charges involved with revision surgery will also be your responsibility. II have also explained to the patient that we may obtain photographs.   These images or illustration along with my medical records created in my case may be used for obtaining insurance approval, for use in examination, may assist in education, testing, credentialing and or certifying by [facility]. These images and records may be used to communicate with referring physician. The following information was discussed with the patient, but this is not an all-inclusive-other issue were also discussed with patient. GENERAL INFORMATION  Flap surgery is frequently performed to cover a defect. Flap taken from one area of the body to restore coverage in other area(s). Flaps are also moved to adjacent areas of the body as well. Flaps help wounds heal that otherwise would not heal adequately. Flaps are useful in situations where there are not adequate subcutaneous tissues present to provide support and blood supply for the skin graft. Flaps are generally classified as to location of transfer. Flaps are generally full thickness tend to be used for specific wound coverage applications when there is full loss of skin and tissue. Flaps are an effective means of assisting wound healing when there has been a loss of skin and tissue due to conditions that involve disease, injuries, or surgical removal of tumors. Some wounds may be too complex to heal without other more involved reconstructive techniques. In some situations, surgical procedure(s) and other treatments may be needed to prepare a wound for a flap. When the flap is moved from one area to the other day will be a defect from the donor area. This may be to loss of function, strength, sensation and other deformities. These deformities include but not limited to external visible scars, defects where the bulk of the muscle tissues no longer available in that area. ALTERNATIVE TREATMENTS  Alternative forms of care consist of not undergoing surgery. Some minor wounds may heal without surgery. In other situations, different forms of treatment such as the transfer of skin and other composite pieces of tissue may be preferable to flaps.   Microsurgical tissue transfer may be necessary in situations when ordinary surgical techniques cannot post-operative period or at any time following surgery. Subacute or chronic infections may be difficult to diagnose. Should an infection occur, treatment including antibiotics. You may need additional surgery. In extremely rare instances, life-threatening infections, including toxic shock syndrome have been noted after tissue expander/implant/nasal packing or any implantable device or packing. Individuals with an active infection in their body or weakened immune system (currently receiving or have received chemotherapy or drugs to suppress the immune system), may be at greater risk for infection. Inability to Heal- Conditions that involve disease, injuries including burns, or surgical removal of tumors/cancers can produce severe wounds. Flaps require adequate blood supply for survival.  Areas of the body where there is inadequate blood supply due to injury, disease states, or the effect of radiation therapy, may not be capable of providing adequate blood supply for flap survival.  Flaps are also vulnerable to loss in disease situations where there is a propensity for chronic swelling or vascular insufficiency disorders. Some wounds may be of the extent and severity that flaps cannot produce closure of the wound and healing. More involved reconstructive surgical procedures may be necessary. Scarring- All surgery leaves scars, some more visible than others. Although wound healing after a surgical procedure is expected, abnormal scars may occur within the skin and deeper tissues. Scars may be unattractive and of different color than the surrounding skin tone. Scar appearance may also vary within the same scar. In some cases, scars may require surgical revision or treatment. Depending on the location of the scar it may cause restriction in the movement of joints. These Scars may require resection and therapy post operatively.     Obesity: If you're BMI is greater than 30 you may have a higher chance of complications. This may include but not limited to wound healing and infections. Also, if you have other medical problems such as diabetes and hypertension it may affect your healing as well as your surgical results in bleeding. Firmness:  Excessive firmness can occur after surgery due to the internal scarring. This can also be due to fat necrosis. This occurrence is not predictable. Additional treatment or surgery may be required to treat this. Skin Sensation- Diminished (or loss) of skin sensation in the donor location for the flap as well as the location where the flap is placed may occur and not totally resolve after flap surgery. Skin flaps generally do not regain normal skin sensation. Injuries may occur secondary to this lack of sensation if the flap is subjected to excessive heat, cold, or physical force. Flaps placed in areas of decreased sensation are prone to injury and loss. Care must be given to avoid injury to these areas or complications may occur. Skin Contour Irregularities- Contour irregularities and depressions may occur after flap surgery. Visible and palpable wrinkling of skin can occur. Residual skin irregularities at the ends of the incisions or dog ears are always a possibility and may require additional surgery. This may improve with time, or it can be surgically corrected. Delayed Healing- Wound disruption or delayed wound healing is possible. Scarring and inadequate healing may occur in the location where the flap is taken for transfer to other parts of the body. Healing of the donor area may take unacceptably long periods of time. The donor area once healed may be prone to abrasions. The flap may heal abnormally or slowly. Some areas of the flap may die, requiring frequent dressing changes or further surgery to remove the non-healed tissue. Smokers have a greater risk of skin loss and wound healing complications.   Skin Discoloration / Swelling- Some bruising varying intensity and duration may occur and persist after surgery. Chronic pain may occur from nerves becoming trapped in scar tissue or from other causes after flap surgery. Thrombosed Veins- Thrombosed veins, which resemble cords, occasionally develop in the area of the surgery, and resolve without medical or surgical treatment. Unusual Activities and Occupations- Activities and occupations that have the potential for trauma to the area could potentially break or damage any device or cause bleeding/seroma. Buried Surgical Staples / Sutures- Sutures and staples used to hold flap in place could potentially become buried under the skin during healing. Sutures may spontaneously poke through the skin, become visible or produce irritation that requires removal.  Additional surgery may be necessary to remove buried staples and sutures. Lacks flap Durability- Some flaps do not have the normal padding and durability of normal, undamaged skin. Flaps lack the normal ability of skin to resist ordinary abrasions and injuries. Shock- In some circumstances, your surgical procedure can cause severe trauma, particularly when multiple or extensive procedures are performed. Although serious complications are infrequent, infections or excessive fluid loss can lead to severe illness and even death. If surgical shock occurs, hospitalization and additional treatment would be necessary. Unsatisfactory Result- Although good results are expected, there is no guarantee or warranty expressed or implied, on the results that may be obtained. There is the possibility of a poor result from flap surgery. This would include risks such as unacceptable visible deformities, loss of function, poor healing, wound disruption, skin and soft tissue loss, chronic pain and loss of sensation. There may be unacceptable cosmetic deformities from flaps placed on visible portions of the body or in the flap donor areas.   Abnormal color of flap may occur depending on its origin. It may be necessary to perform additional surgery to improve your results. Cardiac and Pulmonary Complications- Surgery, especially longer procedures, may be associated with the formation of, or increase in, blood clots in the venous system. Pulmonary complications may occur secondarily to both blood clots (pulmonary emboli), fat deposits (fat emboli) or partial collapse of the lungs after general anesthesia. Pulmonary and fat emboli can be life-threatening or fatal in some circumstances. Air travel, inactivity and other conditions may increase the incidence of blood clots traveling to the lungs causing a major blood clot that may result in death. It is important to discuss with your physician any past history of blood clots or swollen legs that may contribute to this condition. Cardiac complications are a risk with any surgery and anesthesia, even in patients without symptoms. Should any of these complications occur, you may require hospitalization and additional treatment. *  Mental Health Disorders and Surgery- It is important that all patients seeking to undergo surgery have realistic expectations that focus on improvement rather than perfection. Complications or less than satisfactory results are sometimes unavoidable, may require additional surgery and often are stressful. Please openly discuss with your surgeon, prior to surgery, any history that you may have of significant emotional depression or mental health disorders. Although many individuals may benefit psychologically from the results of surgery, effects on mental health cannot be accurately predicted.     Smoking, Second-Hand Smoke Exposure, Nicotine Products (Patch, Gum, Nasal Spray)-   Patients who are currently smoking, use tobacco products, or nicotine products (patch, gum, or nasal spray) are at a greater risk for significant surgical complications of skin dying, increase risk of partial or complete flap loss, delayed healing, and additional scarring. Individuals exposed to second-hand smoke are also at potential risk for similar complications attributable to nicotine exposure. Additionally, smoking may have a significant negative effect on anesthesia and recovery from anesthesia, with coughing and possibly increased bleeding. Individuals who are not exposed to tobacco smoke or nicotine-containing products have a significantly lower risk of this type of complication  These images or illustration along with my medical records created in my case may be used for obtaining insurance approval, for use in examination, may assist in education, testing, credentialing and or certifying by [facility]. These images and records may be used to communicate with referring physician. Medications- There are many adverse reactions that occur as the result of taking over the counter, herbal, and/or prescription medications. Be sure to check with your physician about any drug interactions that may exist with medications which you are already taking. If you have an adverse reaction, stop the drugs immediately and call for further instructions. If the reaction is severe, go immediately to the nearest emergency room. When taking the prescribed pain medications after surgery, realize that they can affect your thought process and coordination. Do not drive, do not operate complex equipment, do not make any important decisions, and do not drink any alcohol while taking these medications. Be sure to take your prescribed medication only as directed. PATIENT COMPLIANCE   Follow all physician instructions carefully; this is essential for the success of your outcome. It is important that the flap is not subjected to excessive force, swelling, abrasion, or motion during the time of healing or flap loss may occur.  flap donor locations are similarly vulnerable to injury during the healing process. Protective dressings and drains should not be removed unless instructed by your plastic surgeon. Successful post-operative function depends on both surgery and subsequent rehabilitation. You may be advised to wear compressive garments, casts to control both swelling and protect the flap following surgery. Personal and vocational activity needs to be restricted. Physical activity that increases your pulse or heart rate may cause bruising, swelling, fluid accumulation and the need for return to surgery. It is wise to refrain from intimate physical activities after surgery until your physician states it is safe. It is important that you participate in follow-up care, return for aftercare, and promote your recovery after surgery. ADDITIONAL SURGERY NECESSARY  Should complications occur, additional surgery or other treatments may be necessary. Even though risks and complications occur infrequently, the risks cited are particularly associated with flap surgery. Other complications and risks can occur. The practice of medicine and surgery is not an exact science. Although good results are expected, there is no guarantee or warranty expressed or implied on the results that may be obtained. HEALTH INSURANCE  Most health insurance companies exclude coverage for cosmetic surgical operations or any complications that might occur from surgery. Please carefully review your health insurance subscriber information pamphlet. Most insurance plans exclude coverage for secondary or reversionary surgery. FINANCIAL RESPONSIBILITIES  The cost of surgery involves several charges for the services provided. The total includes fees charged by the surgeon, the cost of surgical supplies, anesthesia, laboratory tests, and possible outpatient hospital charge.   Depending on whether the cost of surgery is covered by an insurance plan, you will be responsible for necessary co-payments, deductibles, and charges not covered. The fees charged for this procedure do not include any potential future costs for additional procedures that you elect to have or require in order to revise, optimize, or complete your outcome. Additional costs may occur should complications develop from the surgery. Secondary surgery or hospital day-surgery charges involved with revision surgery will also be your responsibility. The following information was discussed with the patient, but this is not an all-inclusive-other issues were also discussed with patient. GENERAL INFORMATION  Skin graft surgery is frequently performed by plastic surgeons by using skin taken from one area of the body to restore skin coverage in other area(s). Skin grafts help wounds heal that otherwise would not heal adequately. Skin grafts are useful in situations where there is adequate subcutaneous tissues present to provide support and blood supply for the skin graft. Skin grafts are generally classified as to the thickness of the skin that is being grafted from one part of the body to some other region. A split-thickness skin graft does not comprise the entire thickness of skin. The donor area where the split-thickness graft is taken can heal on its own. Large areas of the body can be used for split-thickness skin grafts. The full thickness skin graft is different as it involves the full thickness of skin and deeper tissues. Full-thickness grafts tend to be used for specific wound coverage applications when thicker skin is needed. The donor area for the full thickness graft is limited in size as full-thickness skin graft donor sites cannot be used more than one time. Skin grafts are an effective means of assisting wound healing when there has been a loss of skin due to conditions that involve disease, injuries including burns, or surgical removal of tumors. Some wounds may be too complex to heal without other more involved reconstructive techniques.   In some situations, surgical procedure(s) and other treatments (dressing changes and hydrotherapy) may be needed to prepare a wound for a skin graft. ALTERNATIVE TREATMENTS  Alternative forms of care consist of not undergoing surgery. Some minor wounds may heal without surgery. In other situations, different forms of treatment such as the transfer of skin and other composite pieces of tissue may be preferable to skin grafts. Microsurgical tissue transfer may be necessary in situations when ordinary surgical techniques cannot provide for satisfactory tissue to cover a complex wound. Risks and potential complications are associated with alternative surgical forms of treatment. Although wounds can heal spontaneously, there may be increased risk of unsatisfactory result, scarring, and functional impairment. RISKS OF SKIN GRAFT SURGERY  Every surgical procedure involves a certain amount of risk and it is important that you understand these risks and the possible complications associated with them. In addition, every procedure has limitations. An individual's choice to undergo a surgical procedure is based on the comparison of the risk to potential benefit. Although the majority of patients do not experience these complications, you should discuss each of them with your plastic surgeon to make sure you understand all possible consequences of skin graft surgery. Bleeding- It is possible, though unusual, to experience a bleeding episode during or after surgery. Intraoperative blood transfusions may be required. Should post-operative bleeding occur, it may require an emergency treatment to drain the accumulated blood or blood transfusion. Do not take any aspirin or anti-inflammatory medications for ten days before or after surgery, as this may increase the risk of bleeding. Non-prescription herbs and dietary supplements can increase the risk of surgical bleeding. Hematoma can occur at any time following injury.   If blood transfusions are necessary to treat blood loss, there is the risk of blood-related infections such as hepatitis and HIV (AIDS). Heparin medications that are used to prevent blood clots in veins can produce bleeding and decreased blood platelets. Infection- Infections after skin graft surgery may occur. There is the possibility of skin graft failure or scarring from an infection. Should an infection occur, additional treatment including antibiotics, hospitalization, or additional surgery may be necessary. Inability to Heal- Conditions that involve disease, injuries including burns, or surgical removal of tumors can produce severe wounds. Skin grafts require adequate blood supply for survival.  Areas of the body where there is inadequate blood supply due to injury, disease states, or the effect of radiation therapy, may not be capable of providing adequate blood supply for skin graft survival.  Skin grafts are also vulnerable to loss in disease situations where there is a propensity for chronic swelling or vascular insufficiency disorders. Some wounds may be of the extent and severity that skin grafts cannot produce closure of the wound and healing. More involved reconstructive surgical procedures may be necessary. Scarring- All surgery leaves scars, some more visible than others. Although good wound healing after a surgical procedure is expected, abnormal scars may occur within the skin and deeper tissues. Scars may be unattractive and of different color than the surrounding skin tone. Scar appearance may also vary within the same scar. Special compressive garments may be needed to help control scarring. In some cases scars may require surgical revision or treatment. Skin Sensation- Diminished (or loss) of skin sensation in the donor location for the graft as well as the location where the graft is placed may occur and not totally resolve after skin graft surgery.   Skin grafts generally do not regain normal skin sensation. Injuries may occur secondary to this lack of sensation if the skin graft is subjected to excessive heat, cold, or physical force. Skin grafts placed in areas of decreased sensation are prone to injury and loss. Care must be given to avoid injury to these areas or complications may occur. Skin Contour Irregularities- Contour irregularities and depressions may occur after skin graft surgery. Visible and palpable wrinkling of skin can occur. If a skin graft has been processed in a graft meshing device, it may heal with a pattern. Residual skin irregularities at the ends of the incisions or dog ears are always a possibility and may require additional surgery. This may improve with time, or it can be surgically corrected. Delayed Healing- Wound disruption or delayed wound healing is possible. Scarring and inadequate healing may occur in the location where the skin graft is taken for transfer to other parts of the body. Healing of the donor area may take unacceptably long periods of time. The donor area once healed may be prone to abrasions. The skin graft may heal abnormally or slowly. Some areas of skin may die, requiring frequent dressing changes or further surgery to remove the non-healed tissue. Smokers have a greater risk of skin loss and wound healing complications. Skin Discoloration / Swelling- Some bruising and swelling normally occurs following surgery. Skin grafts and the skin graft donor location can undergo changes in color. It is possible to have these areas be either darker or lighter than surrounding skin. These changes can be permanent. Skin Sensitivity- Itching is a common complaint in both the skin graft donor location and the recipient location. Graft abrasion may occur from scratching. Additionally, these areas may have exaggerated responses to hot or cold temperatures. Usually this resolves during healing, but it may be chronic.   Inability to Restore Function- In some situations, skin grafts cannot restore the normal function of intact skin or undamaged deeper structures. Although it may be possible to produce healing with a skin graft, there can be a loss of function. Additional treatment and surgery may be necessary. Surgical Anesthesia- Both local and general anesthesia involve risk. There is the possibility of complications, injury, and even death from all forms of surgical anesthesia or sedation. Damage to Deeper Structures- There is the potential for injury to deeper structures including nerves, blood vessels, muscles, and lungs (pneumothorax) during any surgical procedure. The potential for this to occur varies according to where on the body surgery is being performed. Injury to deeper structures may be temporary or permanent. Allergic Reactions- In rare cases, local allergies to tape, suture materials and glues, blood products, topical preparations or injected agents have been reported. Serious systemic reactions including shock (anaphylaxis) may occur to drugs used during surgery and prescription medications. Allergic reactions may require additional treatment. Skin Cancer in Skin Grafts- Skin cancer can rarely occur in skin grafts. Pain- You will experience pain after your surgery. Pain of varying intensity and duration may occur and persist after surgery. Chronic pain may occur very infrequently from nerves becoming trapped in scar tissue or from other causes after skin graft surgery. Buried Surgical Staples / Sutures- Sutures and staples used to hold skin grafts in place can potentially become buried under the skin during healing. Sutures may spontaneously poke through the skin, become visible or produce irritation that requires removal.  Additional surgery may be necessary to remove buried staples and sutures. Lacks of Graft Durability- Skin grafts do not have the normal padding and durability of normal, undamaged skin. Skin grafts lack the normal ability of skin to resist ordinary abrasions and injuries. Shock- In rare circumstances, your surgical procedure can cause severe trauma, particularly when multiple or extensive procedures are performed. Although serious complications are infrequent, infections or excessive fluid loss can lead to severe illness and even death. If surgical shock occurs, hospitalization and additional treatment would be necessary. Unsatisfactory Result- Although good results are expected, there is no guarantee or warranty expressed or implied, on the results that may be obtained. There is the possibility of a poor result from skin graft surgery. This would include risks such as unacceptable visible deformities, loss of function, poor healing, wound disruption, skin and soft tissue loss, chronic pain and loss of sensation. There may be unacceptable cosmetic deformities from skin grafts placed on visible portions of the body or in the skin graft donor areas. Abnormal color of skin graft and graft origin location may occur. It may be necessary to perform additional surgery to improve your results. Cardiac and Pulmonary Complications- Surgery, especially longer procedures, may be associated with the formation of, or increase in, blood clots in the venous system. Pulmonary complications may occur secondarily to both blood clots (pulmonary emboli), fat deposits (fat emboli) or partial collapse of the lungs after general anesthesia. Pulmonary and fat emboli can be life-threatening or fatal in some circumstances. Air travel, inactivity and other conditions may increase the incidence of blood clots traveling to the lungs causing a major blood clot that may result in death. It is important to discuss with your physician any past history of blood clots or swollen legs that may contribute to this condition.   Cardiac complications are a risk with any surgery and anesthesia, even in patients without symptoms. If you experience shortness of breath, chest pains, or unusual heart beats, seek medical attention immediately. Should any of these complications occur, you may require hospitalization and additional treatment. Obesity: If you're BMI is greater than 30 you may have a higher chance of complications. This may include but not limited to wound healing and infections. Also, if you have other medical problems such as diabetes and hypertension it may affect your healing as well as your surgical results in bleeding. Mental Health Disorders and Surgery- It is important that all patients seeking to undergo surgery have realistic expectations that focus on improvement rather than perfection. Complications or less than satisfactory results are sometimes unavoidable, may require additional surgery and often are stressful. Please openly discuss with your surgeon, prior to surgery, any history that you may have of significant emotional depression or mental health disorders. Although many individuals may benefit psychologically from the results of surgery, effects on mental health cannot be accurately predicted. Smoking, Second-Hand Smoke Exposure, Nicotine Products (Patch, Gum, Nasal Spray)-   Patients who are currently smoking, use tobacco products, or nicotine products (patch, gum, or nasal spray) are at a greater risk for significant surgical complications of skin dying, delayed healing, and additional scarring. Individuals exposed to second-hand smoke are also at potential risk for similar complications attributable to nicotine exposure. Additionally, smoking may have a significant negative effect on anesthesia and recovery from anesthesia, with coughing and possibly increased bleeding. Smokers also how significantly higher risk of pulmonary complications after surgery including but not limited to pneumonia.   It is important to refrain from smoking at least 6 weeks before surgery and until your

## 2021-10-29 ENCOUNTER — TELEPHONE (OUTPATIENT)
Dept: PRIMARY CARE CLINIC | Age: 71
End: 2021-10-29

## 2021-10-29 NOTE — PROGRESS NOTES
DAY OF SURGERY/PROCEDURE  GUIDELINES    As a patient at the [facility], you can expect quality medical and nursing care that is centered on your individual needs. It is our goal to make your surgical experience as comfortable and excellent as possible.  ________________________________________________________________________    The following instructions are general guidelines, if any information on this sheet is different from what your doctor has instructed you to do, please follow your doctor's instructions. · Please arrive on       · Enter through entrance C. Check in at registration     · Upon arrival you will be taken to the pre-operative area to get ready for surgery, your family will stay in the waiting room and visit with you once you are ready for surgery. Due to special limitations please limit visitation to 1-2 members of your family at a time. When it is time for surgery your family will return to the waiting room. · You will be given a specific time when you will NOT be able to eat, drink, smoke, suck or chew ANYTHING (no water, gum, mints, cigarettes, cigars, pipes, snuff, chewing tobacco, etc.) or your surgery may be canceled. This will occur during your PAT appointment or by phone 1-2 days before surgery    · Take a shower or bath on the morning of your surgery/procedure (Hibiclens if directed)    · Brush your teeth, but do not swallow any water    · IN CASE OF ILLNESS - If you have a cold or flu symptoms (high fever, runny nose, sore throat, cough, etc.) rash, nausea, vomiting, loose stools, and/or recent contact with someone who has a contagious disease (chick pox, measles, etc.) please call your doctor before coming to the surgery center    · Take a small sip of water with heart, blood pressure, and/or seizure medication the morning of surgery.      · If applicable bring your:  · Inhaler (s)  · Hearing aid(s)  · Eyeglasses and Case (If you wear contacts they have to be removed before surgery, bring case and solution)  · CPAP     · DO NOT take anticoagulants (blood thinners, aspirin or aspirin-containing products) as instructed by your physician. · DO NOT take any diabetic pills or insulin morning of your surgery. · Wear loose, comfortable clothing that is easy to put on and take off. They will remain in post-op with the nurse. · If you will be returning home the same day as your surgery, you will need to have a responsible adult (25years of age or older) present to drive you home. You will need someone stay with you at home for the first 24 hours following your surgery. This is due to the anesthesia and the medication given to you during surgery and recovery.

## 2021-10-29 NOTE — TELEPHONE ENCOUNTER
Looks like it is for surgery with Dr. Jenaro Fiore for [facility]. She needs to call their office and ask what they prefer.

## 2021-11-01 ENCOUNTER — TELEPHONE (OUTPATIENT)
Dept: SURGERY | Age: 71
End: 2021-11-01

## 2021-11-01 NOTE — TELEPHONE ENCOUNTER
Patient is stating she is stopping them , 1 week before the surgery. The surgery is for melanoma on her forehead .  Dr. Kathy Aguilar  (11/12/2021)

## 2021-11-01 NOTE — TELEPHONE ENCOUNTER
Rec'd medical clearance for patient. Advised to stop Plavix 5 days before procedure. (clearance in media)  Patient advised.   Voice understanding

## 2021-11-16 ENCOUNTER — ANESTHESIA EVENT (OUTPATIENT)
Dept: OPERATING ROOM | Age: 71
End: 2021-11-16
Payer: MEDICARE

## 2021-11-17 ENCOUNTER — HOSPITAL ENCOUNTER (OUTPATIENT)
Age: 71
Setting detail: OUTPATIENT SURGERY
Discharge: HOME | End: 2021-11-17
Attending: PLASTIC SURGERY | Admitting: PLASTIC SURGERY
Payer: MEDICARE

## 2021-11-17 ENCOUNTER — ANESTHESIA (OUTPATIENT)
Dept: OPERATING ROOM | Age: 71
End: 2021-11-17
Payer: MEDICARE

## 2021-11-17 VITALS
TEMPERATURE: 97.3 F | WEIGHT: 157 LBS | BODY MASS INDEX: 28.89 KG/M2 | SYSTOLIC BLOOD PRESSURE: 160 MMHG | RESPIRATION RATE: 15 BRPM | HEIGHT: 62 IN | OXYGEN SATURATION: 96 % | DIASTOLIC BLOOD PRESSURE: 62 MMHG | HEART RATE: 60 BPM

## 2021-11-17 VITALS — OXYGEN SATURATION: 98 % | SYSTOLIC BLOOD PRESSURE: 158 MMHG | DIASTOLIC BLOOD PRESSURE: 69 MMHG | TEMPERATURE: 96.3 F

## 2021-11-17 DIAGNOSIS — G89.18 POST-OP PAIN: Primary | ICD-10-CM

## 2021-11-17 PROCEDURE — 88331 PATH CONSLTJ SURG 1 BLK 1SPC: CPT

## 2021-11-17 PROCEDURE — 13132 CMPLX RPR F/C/C/M/N/AX/G/H/F: CPT | Performed by: PLASTIC SURGERY

## 2021-11-17 PROCEDURE — 88332 PATH CONSLTJ SURG EA ADD BLK: CPT

## 2021-11-17 PROCEDURE — 3600000012 HC SURGERY LEVEL 2 ADDTL 15MIN: Performed by: PLASTIC SURGERY

## 2021-11-17 PROCEDURE — 3700000000 HC ANESTHESIA ATTENDED CARE: Performed by: PLASTIC SURGERY

## 2021-11-17 PROCEDURE — 11644 EXC F/E/E/N/L MAL+MRG 3.1-4: CPT | Performed by: PLASTIC SURGERY

## 2021-11-17 PROCEDURE — 2709999900 HC NON-CHARGEABLE SUPPLY: Performed by: PLASTIC SURGERY

## 2021-11-17 PROCEDURE — 7100000011 HC PHASE II RECOVERY - ADDTL 15 MIN: Performed by: PLASTIC SURGERY

## 2021-11-17 PROCEDURE — 88305 TISSUE EXAM BY PATHOLOGIST: CPT

## 2021-11-17 PROCEDURE — 3600000002 HC SURGERY LEVEL 2 BASE: Performed by: PLASTIC SURGERY

## 2021-11-17 PROCEDURE — 93005 ELECTROCARDIOGRAM TRACING: CPT | Performed by: ANESTHESIOLOGY

## 2021-11-17 PROCEDURE — 2580000003 HC RX 258: Performed by: ANESTHESIOLOGY

## 2021-11-17 PROCEDURE — 7100000010 HC PHASE II RECOVERY - FIRST 15 MIN: Performed by: PLASTIC SURGERY

## 2021-11-17 PROCEDURE — 2500000003 HC RX 250 WO HCPCS: Performed by: NURSE ANESTHETIST, CERTIFIED REGISTERED

## 2021-11-17 PROCEDURE — 2500000003 HC RX 250 WO HCPCS: Performed by: PLASTIC SURGERY

## 2021-11-17 PROCEDURE — 3700000001 HC ADD 15 MINUTES (ANESTHESIA): Performed by: PLASTIC SURGERY

## 2021-11-17 PROCEDURE — 6370000000 HC RX 637 (ALT 250 FOR IP)

## 2021-11-17 PROCEDURE — 6360000002 HC RX W HCPCS: Performed by: NURSE ANESTHETIST, CERTIFIED REGISTERED

## 2021-11-17 RX ORDER — LIDOCAINE HYDROCHLORIDE AND EPINEPHRINE 10; 10 MG/ML; UG/ML
INJECTION, SOLUTION INFILTRATION; PERINEURAL PRN
Status: DISCONTINUED | OUTPATIENT
Start: 2021-11-17 | End: 2021-11-17 | Stop reason: ALTCHOICE

## 2021-11-17 RX ORDER — OXYCODONE HYDROCHLORIDE AND ACETAMINOPHEN 5; 325 MG/1; MG/1
TABLET ORAL
Status: COMPLETED
Start: 2021-11-17 | End: 2021-11-17

## 2021-11-17 RX ORDER — ONDANSETRON 2 MG/ML
4 INJECTION INTRAMUSCULAR; INTRAVENOUS
Status: DISCONTINUED | OUTPATIENT
Start: 2021-11-17 | End: 2021-11-17 | Stop reason: HOSPADM

## 2021-11-17 RX ORDER — LABETALOL 20 MG/4 ML (5 MG/ML) INTRAVENOUS SYRINGE
10 EVERY 10 MIN PRN
Status: DISCONTINUED | OUTPATIENT
Start: 2021-11-17 | End: 2021-11-17 | Stop reason: HOSPADM

## 2021-11-17 RX ORDER — BALANCED SALT SOLUTION ENRICHED WITH BICARBONATE, DEXTROSE, AND GLUTATHIONE
KIT INTRAOCULAR
Status: DISCONTINUED
Start: 2021-11-17 | End: 2021-11-17 | Stop reason: HOSPADM

## 2021-11-17 RX ORDER — LIDOCAINE HYDROCHLORIDE 10 MG/ML
INJECTION, SOLUTION EPIDURAL; INFILTRATION; INTRACAUDAL; PERINEURAL PRN
Status: DISCONTINUED | OUTPATIENT
Start: 2021-11-17 | End: 2021-11-17 | Stop reason: SDUPTHER

## 2021-11-17 RX ORDER — DEXAMETHASONE SODIUM PHOSPHATE 10 MG/ML
INJECTION INTRAMUSCULAR; INTRAVENOUS PRN
Status: DISCONTINUED | OUTPATIENT
Start: 2021-11-17 | End: 2021-11-17 | Stop reason: SDUPTHER

## 2021-11-17 RX ORDER — SODIUM CHLORIDE 0.9 % (FLUSH) 0.9 %
10 SYRINGE (ML) INJECTION EVERY 12 HOURS SCHEDULED
Status: DISCONTINUED | OUTPATIENT
Start: 2021-11-17 | End: 2021-11-17 | Stop reason: HOSPADM

## 2021-11-17 RX ORDER — ONDANSETRON 2 MG/ML
INJECTION INTRAMUSCULAR; INTRAVENOUS PRN
Status: DISCONTINUED | OUTPATIENT
Start: 2021-11-17 | End: 2021-11-17 | Stop reason: SDUPTHER

## 2021-11-17 RX ORDER — OXYCODONE HYDROCHLORIDE AND ACETAMINOPHEN 5; 325 MG/1; MG/1
2 TABLET ORAL PRN
Status: COMPLETED | OUTPATIENT
Start: 2021-11-17 | End: 2021-11-17

## 2021-11-17 RX ORDER — MORPHINE SULFATE 2 MG/ML
2 INJECTION, SOLUTION INTRAMUSCULAR; INTRAVENOUS EVERY 5 MIN PRN
Status: DISCONTINUED | OUTPATIENT
Start: 2021-11-17 | End: 2021-11-17 | Stop reason: HOSPADM

## 2021-11-17 RX ORDER — OXYCODONE HYDROCHLORIDE AND ACETAMINOPHEN 5; 325 MG/1; MG/1
1 TABLET ORAL PRN
Status: COMPLETED | OUTPATIENT
Start: 2021-11-17 | End: 2021-11-17

## 2021-11-17 RX ORDER — CEPHALEXIN 500 MG/1
500 CAPSULE ORAL 4 TIMES DAILY
Qty: 40 CAPSULE | Refills: 0 | Status: SHIPPED | OUTPATIENT
Start: 2021-11-17 | End: 2022-04-25 | Stop reason: ALTCHOICE

## 2021-11-17 RX ORDER — DIPHENHYDRAMINE HYDROCHLORIDE 50 MG/ML
12.5 INJECTION INTRAMUSCULAR; INTRAVENOUS
Status: DISCONTINUED | OUTPATIENT
Start: 2021-11-17 | End: 2021-11-17 | Stop reason: HOSPADM

## 2021-11-17 RX ORDER — LIDOCAINE HYDROCHLORIDE 10 MG/ML
1 INJECTION, SOLUTION EPIDURAL; INFILTRATION; INTRACAUDAL; PERINEURAL
Status: DISCONTINUED | OUTPATIENT
Start: 2021-11-17 | End: 2021-11-17 | Stop reason: HOSPADM

## 2021-11-17 RX ORDER — SODIUM CHLORIDE 9 MG/ML
25 INJECTION, SOLUTION INTRAVENOUS PRN
Status: DISCONTINUED | OUTPATIENT
Start: 2021-11-17 | End: 2021-11-17 | Stop reason: HOSPADM

## 2021-11-17 RX ORDER — PROMETHAZINE HYDROCHLORIDE 25 MG/ML
6.25 INJECTION, SOLUTION INTRAMUSCULAR; INTRAVENOUS
Status: DISCONTINUED | OUTPATIENT
Start: 2021-11-17 | End: 2021-11-17 | Stop reason: HOSPADM

## 2021-11-17 RX ORDER — 0.9 % SODIUM CHLORIDE 0.9 %
500 INTRAVENOUS SOLUTION INTRAVENOUS
Status: DISCONTINUED | OUTPATIENT
Start: 2021-11-17 | End: 2021-11-17 | Stop reason: HOSPADM

## 2021-11-17 RX ORDER — CEFAZOLIN SODIUM 2 G/50ML
SOLUTION INTRAVENOUS PRN
Status: DISCONTINUED | OUTPATIENT
Start: 2021-11-17 | End: 2021-11-17 | Stop reason: SDUPTHER

## 2021-11-17 RX ORDER — EPHEDRINE SULFATE/0.9% NACL/PF 50 MG/5 ML
SYRINGE (ML) INTRAVENOUS PRN
Status: DISCONTINUED | OUTPATIENT
Start: 2021-11-17 | End: 2021-11-17 | Stop reason: SDUPTHER

## 2021-11-17 RX ORDER — TRAMADOL HYDROCHLORIDE 50 MG/1
50 TABLET ORAL EVERY 4 HOURS PRN
Qty: 42 TABLET | Refills: 0 | Status: SHIPPED | OUTPATIENT
Start: 2021-11-17 | End: 2021-11-24

## 2021-11-17 RX ORDER — SODIUM CHLORIDE 0.9 % (FLUSH) 0.9 %
10 SYRINGE (ML) INJECTION PRN
Status: DISCONTINUED | OUTPATIENT
Start: 2021-11-17 | End: 2021-11-17 | Stop reason: HOSPADM

## 2021-11-17 RX ORDER — LIDOCAINE HYDROCHLORIDE AND EPINEPHRINE 10; 10 MG/ML; UG/ML
INJECTION, SOLUTION INFILTRATION; PERINEURAL
Status: DISCONTINUED
Start: 2021-11-17 | End: 2021-11-17 | Stop reason: HOSPADM

## 2021-11-17 RX ORDER — PROPOFOL 10 MG/ML
INJECTION, EMULSION INTRAVENOUS PRN
Status: DISCONTINUED | OUTPATIENT
Start: 2021-11-17 | End: 2021-11-17 | Stop reason: SDUPTHER

## 2021-11-17 RX ORDER — FENTANYL CITRATE 50 UG/ML
INJECTION, SOLUTION INTRAMUSCULAR; INTRAVENOUS PRN
Status: DISCONTINUED | OUTPATIENT
Start: 2021-11-17 | End: 2021-11-17 | Stop reason: SDUPTHER

## 2021-11-17 RX ORDER — HYDRALAZINE HYDROCHLORIDE 20 MG/ML
10 INJECTION INTRAMUSCULAR; INTRAVENOUS EVERY 10 MIN PRN
Status: DISCONTINUED | OUTPATIENT
Start: 2021-11-17 | End: 2021-11-17 | Stop reason: HOSPADM

## 2021-11-17 RX ORDER — SODIUM CHLORIDE, SODIUM LACTATE, POTASSIUM CHLORIDE, CALCIUM CHLORIDE 600; 310; 30; 20 MG/100ML; MG/100ML; MG/100ML; MG/100ML
INJECTION, SOLUTION INTRAVENOUS CONTINUOUS
Status: DISCONTINUED | OUTPATIENT
Start: 2021-11-17 | End: 2021-11-17 | Stop reason: HOSPADM

## 2021-11-17 RX ORDER — MEPERIDINE HYDROCHLORIDE 50 MG/ML
12.5 INJECTION INTRAMUSCULAR; INTRAVENOUS; SUBCUTANEOUS EVERY 5 MIN PRN
Status: DISCONTINUED | OUTPATIENT
Start: 2021-11-17 | End: 2021-11-17 | Stop reason: HOSPADM

## 2021-11-17 RX ADMIN — LIDOCAINE HYDROCHLORIDE 50 MG: 10 INJECTION, SOLUTION EPIDURAL; INFILTRATION; INTRACAUDAL; PERINEURAL at 12:41

## 2021-11-17 RX ADMIN — OXYCODONE HYDROCHLORIDE AND ACETAMINOPHEN 1 TABLET: 5; 325 TABLET ORAL at 14:13

## 2021-11-17 RX ADMIN — PROPOFOL INJECTABLE EMULSION 200 MG: 10 INJECTION, EMULSION INTRAVENOUS at 12:41

## 2021-11-17 RX ADMIN — CEFAZOLIN SODIUM 2000 MG: 2 SOLUTION INTRAVENOUS at 12:54

## 2021-11-17 RX ADMIN — ONDANSETRON 4 MG: 2 INJECTION, SOLUTION INTRAMUSCULAR; INTRAVENOUS at 13:55

## 2021-11-17 RX ADMIN — Medication 10 MG: at 13:22

## 2021-11-17 RX ADMIN — Medication 10 MG: at 13:02

## 2021-11-17 RX ADMIN — Medication 10 MG: at 13:28

## 2021-11-17 RX ADMIN — Medication 10 MG: at 13:17

## 2021-11-17 RX ADMIN — Medication 10 MG: at 13:13

## 2021-11-17 RX ADMIN — SODIUM CHLORIDE, POTASSIUM CHLORIDE, SODIUM LACTATE AND CALCIUM CHLORIDE: 600; 310; 30; 20 INJECTION, SOLUTION INTRAVENOUS at 11:48

## 2021-11-17 RX ADMIN — FENTANYL CITRATE 50 MCG: 50 INJECTION, SOLUTION INTRAMUSCULAR; INTRAVENOUS at 12:41

## 2021-11-17 RX ADMIN — DEXAMETHASONE SODIUM PHOSPHATE 10 MG: 10 INJECTION INTRAMUSCULAR; INTRAVENOUS at 12:48

## 2021-11-17 ASSESSMENT — PULMONARY FUNCTION TESTS
PIF_VALUE: 6
PIF_VALUE: 15
PIF_VALUE: 17
PIF_VALUE: 0
PIF_VALUE: 0
PIF_VALUE: 15
PIF_VALUE: 17
PIF_VALUE: 17
PIF_VALUE: 1
PIF_VALUE: 17
PIF_VALUE: 15
PIF_VALUE: 17
PIF_VALUE: 17
PIF_VALUE: 15
PIF_VALUE: 17
PIF_VALUE: 15
PIF_VALUE: 1
PIF_VALUE: 17
PIF_VALUE: 0
PIF_VALUE: 15
PIF_VALUE: 2
PIF_VALUE: 17
PIF_VALUE: 15
PIF_VALUE: 18
PIF_VALUE: 17
PIF_VALUE: 15
PIF_VALUE: 17
PIF_VALUE: 17
PIF_VALUE: 15
PIF_VALUE: 2
PIF_VALUE: 17
PIF_VALUE: 17
PIF_VALUE: 15
PIF_VALUE: 17
PIF_VALUE: 15
PIF_VALUE: 15
PIF_VALUE: 17
PIF_VALUE: 15
PIF_VALUE: 17
PIF_VALUE: 15
PIF_VALUE: 15
PIF_VALUE: 17
PIF_VALUE: 0
PIF_VALUE: 0
PIF_VALUE: 17
PIF_VALUE: 0
PIF_VALUE: 2
PIF_VALUE: 17
PIF_VALUE: 15
PIF_VALUE: 17
PIF_VALUE: 15
PIF_VALUE: 3
PIF_VALUE: 15
PIF_VALUE: 1
PIF_VALUE: 17
PIF_VALUE: 17
PIF_VALUE: 15
PIF_VALUE: 1
PIF_VALUE: 15
PIF_VALUE: 1
PIF_VALUE: 17
PIF_VALUE: 15
PIF_VALUE: 17
PIF_VALUE: 15
PIF_VALUE: 15
PIF_VALUE: 17
PIF_VALUE: 17
PIF_VALUE: 15
PIF_VALUE: 17

## 2021-11-17 ASSESSMENT — PAIN SCALES - GENERAL
PAINLEVEL_OUTOF10: 4
PAINLEVEL_OUTOF10: 5
PAINLEVEL_OUTOF10: 5

## 2021-11-17 ASSESSMENT — PAIN DESCRIPTION - PAIN TYPE: TYPE: SURGICAL PAIN

## 2021-11-17 ASSESSMENT — LIFESTYLE VARIABLES: SMOKING_STATUS: 1

## 2021-11-17 ASSESSMENT — PAIN - FUNCTIONAL ASSESSMENT: PAIN_FUNCTIONAL_ASSESSMENT: 0-10

## 2021-11-17 NOTE — ANESTHESIA PRE PROCEDURE
Department of Anesthesiology  Preprocedure Note       Name:  Sterling Javed   Age:  80 y.o.  :  1950                                          MRN:  [id number]         Date:  2021      Surgeon: Rochelle Forbes):  Johnnie Carvajal MD    Procedure: Procedure(s):  FOREHEAD BASAL CELL LESION BIOPSY EXCISION possible GRAFT possible FLAP * WITH FROZEN SECTION PATHOLOGY REQUIRED    Medications prior to admission:   Prior to Admission medications    Medication Sig Start Date End Date Taking? Authorizing Provider   amLODIPine (NORVASC) 5 MG tablet Take 1 tablet by mouth daily 10/13/21  Yes DAPHNIE Perez   cyclobenzaprine (FLEXERIL) 10 MG tablet Take 1 tablet by mouth 3 times daily as needed for Muscle spasms 10/12/21  Yes DAPHNIE Perez   atorvastatin (LIPITOR) 20 MG tablet Take 1 tablet by mouth daily 21  Yes DAPHNIE Perez   omeprazole (PRILOSEC) 40 MG delayed release capsule  21  Yes Historical Provider, MD   vitamin B-12 (CYANOCOBALAMIN) 1000 MCG tablet Take 1,000 mcg by mouth daily   Yes Historical Provider, MD   Cholecalciferol (VITAMIN D3 PO) Take 1,000 mg by mouth   Yes Historical Provider, MD   clopidogrel (PLAVIX) 75 MG tablet Take 75 mg by mouth daily. Yes Historical Provider, MD       Current medications:    Current Facility-Administered Medications   Medication Dose Route Frequency Provider Last Rate Last Admin    lactated ringers infusion   IntraVENous Continuous Leonora Lozoya  mL/hr at 21 1148 New Bag at 21 1148    sodium chloride flush 0.9 % injection 10 mL  10 mL IntraVENous 2 times per day Leonora Lozoya MD        sodium chloride flush 0.9 % injection 10 mL  10 mL IntraVENous PRN Leonora Lozoya MD        0.9 % sodium chloride infusion  25 mL IntraVENous PRN Leonora Lozoya MD        lidocaine PF 1 % injection 1 mL  1 mL IntraDERmal Once PRN Leonora Lozoya MD           Allergies:     Allergies   Allergen Reactions    Adhesive Tape     Codeine Rash       Problem List:    Patient Active Problem List   Diagnosis Code    PFO (patent foramen ovale) Q21.1    TIA (transient ischemic attack) G45.9    Osteoporosis M81.0    Stroke syndrome [id number]    CAD (Minimal - non obstructive - cath 11/25/13-Dr. Jenny Hebert)) I25.10    Neoplasm of uncertain behavior of skin D48.5    Seborrheic keratoses L82.1    Cherry angioma D18.01    Lentigines L81.4    Telangiectasias I78.1       Past Medical History:        Diagnosis Date    Arthritic-like pain     Arthritis     Blind left eye     CAD (coronary artery disease)     Cancer ([facility] [address]     uterus,lt kidney    Chest pain 1/8/14    Chronic kidney disease     [facility]deep [facility] thrombosis) ([facility])     Rt. upper arm    Full dentures     GERD (gastroesophageal reflux disease)     Hyperlipidemia     Hypertension     Lazy eye     Rt.  Osteoporosis     Peptic ulcer     PFO (patent foramen ovale) 1/10/14    closure of PFO    Stroke syndrome     x8 sept 2020 last one    TIA (transient ischemic attack)     Wears glasses        Past Surgical History:        Procedure Laterality Date    APPENDECTOMY      BACK SURGERY      fussion L5    CARDIAC CATHETERIZATION      PFO repaired    CARPAL TUNNEL RELEASE      COLONOSCOPY      HAND SURGERY Right     thumb rt    HYSTERECTOMY      KIDNEY REMOVAL Left     PARTIAL NEPHRECTOMY      SPLENECTOMY, TOTAL      THORACOTOMY  1/10/14     min. Inv. PFO closure    TONSILLECTOMY         Social History:    Social History     Tobacco Use    Smoking status: Current Every Day Smoker     Packs/day: 0.50     Years: 48.00     Pack years: 24.00     Types: Cigarettes    Smokeless tobacco: Never Used    Tobacco comment: trying to quit, down to 3-4 cig./day   Substance Use Topics    Alcohol use:  No                                Ready to quit: Not Answered  Counseling given: Not Answered  Comment: trying to quit, down to 3-4 cig./day      Vital Signs (Current):   Vitals: 11/17/21 1129 11/17/21 1132   BP:  (!) 161/83   Pulse: 63    Resp: 20    Temp: 97.5 °F (36.4 °C)    TempSrc: Infrared    SpO2: 97%    Weight: 157 lb (71.2 kg)    Height: 5' 2\" (1.575 m)                                               BP Readings from Last 3 Encounters:   11/17/21 (!) 161/83   10/01/21 138/82   09/17/21 (!) 142/80       NPO Status: Time of last liquid consumption: 1000 (sips of water)                        Time of last solid consumption: 2000                        Date of last liquid consumption: 11/17/21                        Date of last solid food consumption: 11/15/21    BMI:   Wt Readings from Last 3 Encounters:   11/17/21 157 lb (71.2 kg)   10/27/21 155 lb (70.3 kg)   10/01/21 155 lb (70.3 kg)     Body mass index is 28.72 kg/m². CBC:   Lab Results   Component Value Date    WBC 20.3 01/11/2014    RBC 4.46 01/11/2014    HGB 12.7 01/11/2014    HCT 38.5 01/11/2014    MCV 86.4 01/11/2014    RDW 15.5 01/11/2014     01/11/2014       CMP:   Lab Results   Component Value Date     01/11/2014    K 4.0 01/11/2014     01/11/2014    CO2 25 01/11/2014    BUN 12 01/11/2014    CREATININE 0.69 01/11/2014    GFRAA >60 01/11/2014    LABGLOM >60 01/11/2014    GLUCOSE 88 09/02/2021    PROT 7.1 01/09/2014    CALCIUM 8.7 01/11/2014    BILITOT 0.28 01/09/2014    ALKPHOS 94 01/09/2014    AST 21 01/09/2014    ALT 13 01/09/2014       POC Tests: No results for input(s): POCGLU, POCNA, POCK, POCCL, POCBUN, POCHEMO, POCHCT in the last 72 hours.     Coags:   Lab Results   Component Value Date    PROTIME 9.5 01/09/2014    INR 0.9 01/09/2014    APTT 26.9 01/09/2014       HCG (If Applicable): No results found for: PREGTESTUR, PREGSERUM, HCG, HCGQUANT     ABGs: No results found for: PHART, PO2ART, AOS9YNW, DAO8BKG, BEART, C5ASWWTL     Type & Screen (If Applicable):  No results found for: LABABO, LABRH    Drug/Infectious Status (If Applicable):  No results found for: HIV, HEPCAB    COVID-19 Screening (If Applicable): No results found for: COVID19        Anesthesia Evaluation  Patient summary reviewed and Nursing notes reviewed  Airway: Mallampati: II  TM distance: >3 FB   Neck ROM: full  Mouth opening: > = 3 FB Dental:    (+) edentulous      Pulmonary:normal exam    (+) COPD:  asthma: current smoker                          ROS comment: -SMOKES 1 PPD FOR 57 YEARS  -ASTHMA - DAILY ALBUTEROL USE   Cardiovascular:    (+) hypertension:, CAD:,                   Neuro/Psych:   (+) CVA:,              ROS comment: -CVA X 8 - LAST ONE 2019, LEFT EYE BLIND  -OSTEOPOROSIS GI/Hepatic/Renal:   (+) GERD: well controlled, PUD,          ROS comment: -RENAL INSUFFICIENCY. Endo/Other:    (+) : arthritis:., malignancy/cancer. ROS comment: -SKIN CANCER  -UTERUS CANCER  -RENAL CANCER  -NPO AFTER MIDNIGHT  -ALLERGIES - CODEINE Abdominal:             Vascular:   + DVT, .        ROS comment: -DVT - RIGHT UPPER EXTREMITY  -ANTICOAGULATED - STOPPED PLAVIX 2 WEEKS AGO. Other Findings:             Anesthesia Plan      general     ASA 3     (LMA)  Induction: intravenous. MIPS: Postoperative opioids intended and Prophylactic antiemetics administered. Anesthetic plan and risks discussed with patient. Plan discussed with CRNA.     Attending anesthesiologist reviewed and agrees with Steve Hernandez MD   11/17/2021

## 2021-11-17 NOTE — OP NOTE
Operative Note      Patient: Carly Chambers  YOB: 1950  MRN: [id number]    Date of Procedure: 11/17/2021    Pre-Op Diagnosis: BASAL CELL CARCINOMA FOREHEAD    Post-Op Diagnosis: Same       Procedure(s):  Excision of basal cell carcinoma forehead measuring 3.5 x 2.1 cm with complex closure defect size measuring 4 cm x 2.5 cm    Surgeon(s):  Dayna Khan MD    Assistant:   * No surgical staff found *    Anesthesia: Monitor Anesthesia Care    Estimated Blood Loss (mL): Minimal    Complications: None    Specimens:   ID Type Source Tests Collected by Time Destination   A : EXCISION BASAL CELL CARCINOMA - SUTURE AT 12 O'CLOCK Tissue Tissue SURGICAL PATHOLOGY Dayna Khan MD 11/17/2021 1307    B : RE-EXCISION 3-9, SUTURE AT NEW 12 O'CLOCK Tissue Tissue SURGICAL PATHOLOGY Dayna Khan MD 11/17/2021 1308            INDICATION FOR PROCEDURE:  The patient is 80 y.o. female . All the risks, benefits, and alternative treatments were explained to the patient and an informed consent was obtained. DESCRIPTION OF PROCEDURE:  The patient was marked in the holding area. The patient was brought into the room, was placed on the table in the supine position. Anesthesia was induced via LMA. All areas were prepped and draped in usual customary sterile manner. A timeout was performed. Everybody in the room was in agreement with the timeout. Then local anesthetic was injected. The attention was turned to forehead basal cell carcinoma. Then an incision was made over the area. This was done using a #15 blade. The lesion was completely excised. Suture was placed at the 12 o'clock position. Then additional margins were obtained for 3 to 9 o'clock position with a suture at the new 12 o'clock position. The final margins were negative for malignancy. .  Hemostasis was achieved. Then instruments and gloves were changed.   Then undermining was performed medially, laterally, superiorly, and inferiorly to obtain

## 2021-11-17 NOTE — ANESTHESIA POSTPROCEDURE EVALUATION
Department of Anesthesiology  Postprocedure Note    Patient: Karen Vargas  MRN: [id number]  [place]: 1950  Date of evaluation: 11/17/2021  Time:  3:28 PM     Procedure Summary     Date: 11/17/21 Room / Location: [address] [place] [address] 02 / [address] [facility]    Anesthesia Start: 5690 Anesthesia Stop: 0202    Procedure: FOREHEAD BASAL CELL LESION BIOPSY EXCISION possible GRAFT possible FLAP * WITH FROZEN SECTION PATHOLOGY REQUIRED (N/A Face) Diagnosis: (BASAL CELL CARCINOMA FOREHEAD)    Surgeons: Nisha Aguilar MD Responsible Provider: Phillip Cheema MD    Anesthesia Type: general ASA Status: 3          Anesthesia Type: general    Naomi Phase I: Naomi Score: 10    Naomi Phase II: Naomi Score: 9    Last vitals: Reviewed and per EMR flowsheets.        Anesthesia Post Evaluation    Patient location during evaluation: PACU  Patient participation: complete - patient participated  Level of consciousness: awake and alert  Airway patency: patent  Nausea & Vomiting: no nausea and no vomiting  Complications: no  Cardiovascular status: hemodynamically stable  Respiratory status: face mask and spontaneous ventilation  Hydration status: euvolemic  Multimodal analgesia pain management approach

## 2021-11-17 NOTE — H&P
[address] [place] [address] SURGICAL SPECIALISTS  [facility] [id number]       History and Physical     No chief complaint on file. HPI:   Lauren Lewis is a 80 y.o. female who presents with biopsy-proven basal cell carcinoma of the forehead. Patient is here to discuss her options. Patient is on Plavix.   .  Medications:     Current Facility-Administered Medications   Medication Dose Route Frequency Provider Last Rate Last Admin    lactated ringers infusion   IntraVENous Continuous Oliva Mueller  mL/hr at 11/17/21 1148 New Bag at 11/17/21 1148    sodium chloride flush 0.9 % injection 10 mL  10 mL IntraVENous 2 times per day Oliva Mueller MD        sodium chloride flush 0.9 % injection 10 mL  10 mL IntraVENous PRN Oliva Mueller MD        0.9 % sodium chloride infusion  25 mL IntraVENous PRN Oliva Mueller MD        lidocaine PF 1 % injection 1 mL  1 mL IntraDERmal Once PRN Oliva Mueller MD        meperidine (DEMEROL) injection 12.5 mg  12.5 mg IntraVENous Q5 Min PRN Oliva Mueller MD        morphine (PF) injection 2 mg  2 mg IntraVENous Q5 Min PRN Oliva Mueller MD        HYDROmorphone (DILAUDID) injection 0.5 mg  0.5 mg IntraVENous Q5 Min PRN Oliva Mueller MD        HYDROmorphone (DILAUDID) injection 0.25 mg  0.25 mg IntraVENous Q5 Min PRN Oliva Mueller MD        HYDROmorphone (DILAUDID) injection 0.5 mg  0.5 mg IntraVENous Q5 Min PRN Oliva Mueller MD        oxyCODONE-acetaminophen (PERCOCET) 5-325 MG per tablet 1 tablet  1 tablet Oral PRN Oliva Mueller MD        Or    oxyCODONE-acetaminophen (PERCOCET) 5-325 MG per tablet 2 tablet  2 tablet Oral PRN Oliva Mueller MD        ondansetron (ZOFRAN) injection 4 mg  4 mg IntraVENous Once PRN Oliva Mueller MD        promethazine (PHENERGAN) injection 6.25 mg  6.25 mg IntraVENous Q15 Min PRN Oliva Mueller MD        0.9 % sodium chloride bolus  500 mL IntraVENous Once PRN dE Elise MD        diphenhydrAMINE (BENADRYL) injection 12.5 mg  12.5 mg IntraVENous Once PRN Ed Elise MD        labetalol (NORMODYNE;TRANDATE) injection syringe 10 mg  10 mg IntraVENous Q10 Min PRN Ed Elise MD        hydrALAZINE (APRESOLINE) injection 10 mg  10 mg IntraVENous Q10 Min PRN Ed Elise MD        balanced salts plus (BSS) ophthalmic solution             lidocaine-EPINEPHrine 1 %-[id number] injection               Allergies: Allergies   Allergen Reactions    Adhesive Tape     Codeine Rash     Review of Systems:   Constitutional: Negative for fever, chills, fatigue and unexpected weight change. HENT: Negative for hearing loss, sore throat and facial swelling. Eyes: Negative for pain and discharge. Respiratory: Negative for cough and shortness of breath. Cardiovascular: Patient has coronary artery disease, and DVT patient with patent avina ovale. Gastrointestinal: Patient has a history of GERD. Skin: Negative for pallor and rash. Neurological: Patient has stroke syndrome. Patient with a history of TIA. Angie Bloodgood Hematological: Does not bruise/bleed easily. Psychiatric/Behavioral: Negative for behavioral problems. The patient is not nervous/anxious. : Patient with kidney disease. Musculoskeletal: Patient with arthritis. Past Medical History:   Diagnosis Date    Arthritic-like pain     Arthritis     Blind left eye     CAD (coronary artery disease)     Cancer ([facility] [address]     uterus,lt kidney    Chest pain 1/8/14    Chronic kidney disease     DVT (deep venous thrombosis) ([facility])     Rt. upper arm    Full dentures     GERD (gastroesophageal reflux disease)     Hyperlipidemia     Hypertension     Lazy eye     Rt.     Osteoporosis     Peptic ulcer     PFO (patent foramen ovale) 1/10/14    closure of PFO    Stroke syndrome     x8 sept 2020 last one    TIA (transient ischemic attack)     Wears glasses      Past Surgical History:   Procedure Laterality Date    APPENDECTOMY      BACK SURGERY      fussion L5    CARDIAC CATHETERIZATION      PFO repaired    CARPAL TUNNEL RELEASE      COLONOSCOPY      HAND SURGERY Right     thumb rt    HYSTERECTOMY      KIDNEY REMOVAL Left     PARTIAL NEPHRECTOMY      SPLENECTOMY, TOTAL      THORACOTOMY  1/10/14     min. Inv. PFO closure    TONSILLECTOMY       Social History     Socioeconomic History    Marital status:      Spouse name: Not on file    Number of children: Not on file    Years of education: Not on file    Highest education level: Not on file   Occupational History    Not on file   Tobacco Use    Smoking status: Current Every Day Smoker     Packs/day: 0.50     Years: 48.00     Pack years: 24.00     Types: Cigarettes    Smokeless tobacco: Never Used    Tobacco comment: trying to quit, down to 3-4 cig./day   Vaping Use    Vaping Use: Never used   Substance and Sexual Activity    Alcohol use: No    Drug use: No    Sexual activity: Not on file   Other Topics Concern    Not on file   Social History Narrative    Not on file     Social Determinants of Health     Financial Resource Strain: Low Risk     Difficulty of Paying Living Expenses: Not hard at all   Food Insecurity: No Food Insecurity    Worried About Running Out of Food in the Last Year: Never true    [place] of Food in the Last Year: Never true   Transportation Needs:     Lack of Transportation (Medical): Not on file    Lack of Transportation (Non-Medical):  Not on file   Physical Activity:     Days of Exercise per Week: Not on file    Minutes of Exercise per Session: Not on file   Stress:     Feeling of Stress : Not on file   Social Connections:     Frequency of Communication with Friends and Family: Not on file    Frequency of Social Gatherings with Friends and Family: Not on file    Attends Shinto Services: Not on file    Active Member of Clubs or Organizations: Not on file   Cory Whitfield Attends Club or Organization Meetings: Not on file    Marital Status: Not on file   Intimate Partner Violence:     Fear of Current or Ex-Partner: Not on file    Emotionally Abused: Not on file    Physically Abused: Not on file    Sexually Abused: Not on file   Housing Stability:     Unable to Pay for Housing in the Last Year: Not on file    Number of [place] in the Last Year: Not on file    Unstable Housing in the Last Year: Not on file     Family History   Problem Relation Age of Onset    Diabetes Mother     Asthma Mother     Heart Failure Mother         MI    Heart Failure Father         pacemaker    Rheum Arthritis Father     Diabetes Sister     Diabetes Sister     Diabetes Brother     Cancer Sister         hyst    Heart Failure Brother         MI     Physical Exam:   BP (!) 161/83   Pulse 63   Temp 97.5 °F (36.4 °C) (Infrared)   Resp 20   Ht 5' 2\" (1.575 m)   Wt 157 lb (71.2 kg)   SpO2 97%   BMI 28.72 kg/m²    Body mass index is 28.72 kg/m². Physical Exam   Nursing note and vitals reviewed. Constitutional: Oriented to person, place, and time. Appears well-developed and well-nourished. No distress. Head: Normocephalic and atraumatic. Eyes: Conjunctivae and EOM are normal.   Pulmonary/Chest: Effort normal. No respiratory distress. Neurological: Alert and oriented to person, place, and time. Skin:   Patient with biopsy-proven basal cell carcinoma of the forehead. Franky Bran Psychiatric: Normal mood and affect. Behavior is normal    Laboratory:   10/6/2021  2:45 PM - Nik, Mouna Incoming Lab Results From [facility]   Dermatology Pathology Report 10/01/2021  9:46  Justice    -- Diagnosis --   1.  SKIN, CENTRAL FOREHEAD, SHAVE BIOPSY:        -  NODULAR BASAL CELL CARCINOMA.      -  THE LESION EXTENDS TO THE DEEP MARGIN. 2.  SKIN, LEFT BACK, SHAVE BIOPSY:        -  IRRITATED AND INFLAMED SEBORRHEIC KERATOSIS. Marcelina Orozco, M.D.   **Electronically Signed Out**   jet/10/6/2021         Clinical Information   Pre-op Diagnosis: [facility] vs ?     Operative Findings:  CENTRAL FOREHEAD; LEFT BACK     Source of Specimen   1: CENTRAL FOREHEAD   2: LEFT BACK     Gross Description   1.  \"FORTUNATO ONEILL, CENTRAL FOREHEAD\" 1.5 x 1.4 x 0.1 cm tan shave. Inked and trisected 1cs. 2.  \"FORTUNATO ONEILL, LEFT BACK\" 1.5 x 1.4 x 0.1 cm crusted mottled   tan-white shave.  Inked and trisected 1cs.  tm       Microscopic Description   1.  The sections show nodular aggregates of basaloid cells with   peripheral palisading of nuclei present in the reticular dermis. These are surrounded by a fibrovascular stroma.  There is clefting   between the stroma and the tumor nests.  Tumor extends to the deep   margin. 2.  The sections show papillomatous epidermis with foci of chronic   inflammation and parakeratotic crust.  There is no evidence of   malignancy in these sections. SURGICAL PATHOLOGY CONSULTATION         Patient Name: [place] Med Rec: [id number]   Path Number: [id number]     [facility]   ANATOMIC PATHOLOGY   [address] [place] [address]. [place], [address]   [phone]   Fax: [phone]              Impression/Plan:      Diagnosis Orders   1. Basal cell carcinoma (BCC) of skin of other part of face       Patient Active Problem List   Diagnosis    PFO (patent foramen ovale)    TIA (transient ischemic attack)    Osteoporosis    Stroke syndrome    CAD (Minimal - non obstructive - cath 11/25/13-Dr. López Dockery))    Neoplasm of uncertain behavior of skin    Seborrheic keratoses    Cherry angioma    Lentigines    Telangiectasias       Plan:  Patient with biopsy-proven basal cell carcinoma of the forehead. We discussed excision with frozen section. We also discussed possible skin graft and possible flap reconstruction. Patient is on Plavix.   We will need to get clearance prior to any surgical intervention. The following information was discussed with the patient, but this is not an all-inclusive-other issue were also discussed with patient. GENERAL INFORMATION  The surgical removal of skin lesions and tumors is frequently performed by plastic surgeons. Certain skin lesions and skin tumors will not disappear spontaneously, surgical removal is a treatment option. There are many different techniques for removing skin lesions and skin tumors. ALTERNATIVE TREATMENTS  Alternative forms of management consist of not treating the skin lesion/skin tumor condition. Removal of skin lesions and some superficial skin tumors may be accomplished by other treatments including the use of liquid nitrogen (freezing), lasers, topical medications, and electric cautery. Risks and potential complications are associated with alternative forms of treatment. Deeper tumors cannot be treated by this. RISKS OF SKIN LESION/SKIN TUMOR SURGERY    I have explained to the patient that every surgical procedure involves a certain amount of risk and it is important that an understanding of these risks and the possible complications associated with them is understood. In addition, every procedure has limitations. An individual's choice to undergo a surgical procedure is based on the comparison of the risk to potential benefit. Although some of patients do not experience these complications. Bleeding- It is possible, to experience a bleeding episode during or after surgery. Intraoperative blood transfusions may be required. Should post-operative bleeding occur, it may require an emergency treatment to drain the accumulated blood or blood transfusion. Do not take any aspirin or anti-inflammatory medications for ten days before or after surgery, as this may increase the risk of bleeding. Non-prescription herbs and dietary supplements can increase the risk of surgical bleeding.   Hematoma can occur at any time following injury. If blood transfusions are necessary to treat blood loss, there is the risk of blood-related infections such as hepatitis and HIV (AIDS). Heparin medications that are used to prevent blood clots in veins can produce bleeding and decreased blood platelets. Please check with the physician who prescribed that blood thinners such as aspirin Coumadin etc. Prior to stopping them. Infection- Infection can occur after surgery. Should an infection occur, additional treatment including antibiotics, hospitalization, or additional surgery may be necessary. Scarring- All surgery leaves scars, some more visible than others. Although wound healing after a surgical procedure is expected, abnormal scars may occur within the skin and deeper tissues. Scars may be unattractive and of different color than the surrounding skin tone. Scar appearance may also vary within the same scar. There is the possibility of visible marks from sutures used to close the wound after the removal of skin lesions and tumors. There is the possibility that scars may limit motion and function. In some cases, scars may require surgical revision or treatment. Obesity: If you're BMI is greater than 30 you may have a higher chance of complications. This may include but not limited to wound healing and infections. Also, if you have other medical problems such as diabetes and hypertension it may affect your healing as well as your surgical results in bleeding. Damage to Deeper Structures- There is the potential for injury to deeper structures including nerves, blood vessels, muscles, and lungs (pneumothorax) during any surgical procedure. The potential for this to occur varies according to where on the body surgery is being performed. Injury to deeper structures may be temporary or permanent.   Cancer- In some situations, a skin lesion or tumor that appears to be benign may be determined to be cancerous after laboratory analysis. Additional treatments or surgery may be necessary. Recurrence- In some situation, skin lesions and tumors can recur after surgical excision. Additional treatment or secondary surgery may be necessary. Skin Discoloration / Swelling- Some bruising and swelling normally occurs following surgery. The skin in or near the surgical site can appear either lighter or darker than surrounding skin. Although uncommon, swelling and skin discoloration may persist for long periods of time and, in rare situations, may be permanent. Skin Sensitivity- Itching, tenderness, or exaggerated responses to hot or cold temperatures may occur after surgery. Usually this resolves during healing, but in some situations, it may be chronic, permanent. Pain- You will experience pain after your surgery. Pain of varying intensity and duration may occur and persist after surgery. Chronic pain may occur from nerves becoming trapped in scar tissue. Sutures- Some surgical techniques use deep sutures. You may notice these sutures after your surgery. Sutures may spontaneously poke through the skin, become visible or produce irritation that requires removal.  Delayed Healing- Wound disruption or delayed wound healing is possible. Some areas of the skin may not heal normally and may take a long time to heal.  Some areas of skin may die, requiring frequent dressing changes or further surgery to remove the non-healed tissue. Smokers have a greater risk of skin loss and wound healing complications. Skin Contour Irregularities- Contour irregularities and depressions may occur after surgery. Visible and palpable wrinkling of skin can occur. Residual skin irregularities at the ends of the incisions or dog ears are always a possibility and may require additional surgery. This may improve with time, or it can be surgically corrected.   Allergic Reactions- In some cases, local allergies to tape, suture materials and glues, blood heart rate may cause bruising, swelling, fluid accumulation and the need for return to surgery. It is wise to refrain from intimate physical activities after surgery until your physician states it is safe. It is important that you participate in follow-up care, return for aftercare, and promote your recovery after surgery. HEALTH INSURANCE  Most health insurance companies exclude coverage for cosmetic surgical operations or any complications that might occur from surgery. Please carefully review your health insurance subscriber information pamphlet. Most insurance plans exclude coverage for secondary or revisionary surgery. Your type of surgery will most likely be covered by your insurance company, I went there is no guarantees or warrantees implied or otherwise. The cost of surgery involves several charges for the services provided. The total includes fees charged by your surgeon, the cost of surgical supplies, anesthesia, laboratory tests, and possible outpatient hospital charges, depending on where the surgery is performed. Depending on whether the cost of surgery is covered by an insurance plan, you will be responsible for necessary co-payments, deductibles, and charges not covered. The fees charged for this procedure do not include any potential future costs for additional procedures that you elect to have or require in order to revise, optimize, or complete your outcome. Additional costs may occur should complications develop from the surgery. Secondary surgery or hospital day-surgery charges involved with revision surgery will also be your responsibility. II have also explained to the patient that we may obtain photographs. These images or illustration along with my medical records created in my case may be used for obtaining insurance approval, for use in examination, may assist in education, testing, credentialing and or certifying by [facility].   These images and records may be used to communicate with referring physician. The following information was discussed with the patient, but this is not an all-inclusive-other issue were also discussed with patient. GENERAL INFORMATION  Flap surgery is frequently performed to cover a defect. Flap taken from one area of the body to restore coverage in other area(s). Flaps are also moved to adjacent areas of the body as well. Flaps help wounds heal that otherwise would not heal adequately. Flaps are useful in situations where there are not adequate subcutaneous tissues present to provide support and blood supply for the skin graft. Flaps are generally classified as to location of transfer. Flaps are generally full thickness tend to be used for specific wound coverage applications when there is full loss of skin and tissue. Flaps are an effective means of assisting wound healing when there has been a loss of skin and tissue due to conditions that involve disease, injuries, or surgical removal of tumors. Some wounds may be too complex to heal without other more involved reconstructive techniques. In some situations, surgical procedure(s) and other treatments may be needed to prepare a wound for a flap. When the flap is moved from one area to the other day will be a defect from the donor area. This may be to loss of function, strength, sensation and other deformities. These deformities include but not limited to external visible scars, defects where the bulk of the muscle tissues no longer available in that area. ALTERNATIVE TREATMENTS  Alternative forms of care consist of not undergoing surgery. Some minor wounds may heal without surgery. In other situations, different forms of treatment such as the transfer of skin and other composite pieces of tissue may be preferable to flaps.   Microsurgical tissue transfer may be necessary in situations when ordinary surgical techniques cannot provide for satisfactory tissue to cover a complex wound. Risks and potential complications are associated with alternative surgical forms of treatment. Although some wounds can heal spontaneously, there may be increased risk of unsatisfactory result, scarring, and functional impairment. You always have the option of no reconstruction. RISKS OF FLAP SURGERY  Every surgical procedure involves a certain amount of risk and it is important that you understand these risks and the possible complications associated with them. In addition, every procedure has limitations. An individual's choice to undergo a surgical procedure is based on the comparison of the risk to potential benefit. Bleeding- It is possible, though unusual, to experience a bleeding episode during or after surgery. Intraoperative blood transfusions may be required. Should post-operative bleeding occur, it may require an emergency treatment to drain the accumulated blood or blood transfusion. Do not take any aspirin or anti-inflammatory medications for ten days before or after surgery, as this may increase the risk of bleeding. Non-prescription herbs and dietary supplements can increase the risk of surgical bleeding. Hematoma can occur at any time following injury. If blood transfusions are necessary to treat blood loss, there is the risk of blood-related infections such as hepatitis and HIV (AIDS). Heparin medications that are used to prevent blood clots in veins can produce bleeding and decreased blood platelets. Seroma- Fluid may accumulate around the tissue expander/implant or surgical sites following surgery, from trauma or vigorous exercise. Additional treatment may be necessary to drain fluid accumulation around tissue expander or implant or surgical site. This may contribute to infection, capsular contracture, or other problems.     Infection- Infections can occur after any type of surgery, it may appear in the immediate post-operative period or at any time following surgery. Subacute or chronic infections may be difficult to diagnose. Should an infection occur, treatment including antibiotics. You may need additional surgery. In extremely rare instances, life-threatening infections, including toxic shock syndrome have been noted after tissue expander/implant/nasal packing or any implantable device or packing. Individuals with an active infection in their body or weakened immune system (currently receiving or have received chemotherapy or drugs to suppress the immune system), may be at greater risk for infection. Inability to Heal- Conditions that involve disease, injuries including burns, or surgical removal of tumors/cancers can produce severe wounds. Flaps require adequate blood supply for survival.  Areas of the body where there is inadequate blood supply due to injury, disease states, or the effect of radiation therapy, may not be capable of providing adequate blood supply for flap survival.  Flaps are also vulnerable to loss in disease situations where there is a propensity for chronic swelling or vascular insufficiency disorders. Some wounds may be of the extent and severity that flaps cannot produce closure of the wound and healing. More involved reconstructive surgical procedures may be necessary. Scarring- All surgery leaves scars, some more visible than others. Although wound healing after a surgical procedure is expected, abnormal scars may occur within the skin and deeper tissues. Scars may be unattractive and of different color than the surrounding skin tone. Scar appearance may also vary within the same scar. In some cases, scars may require surgical revision or treatment. Depending on the location of the scar it may cause restriction in the movement of joints. These Scars may require resection and therapy post operatively. Obesity: If you're BMI is greater than 30 you may have a higher chance of complications.   This may include but not limited to wound healing and infections. Also, if you have other medical problems such as diabetes and hypertension it may affect your healing as well as your surgical results in bleeding. Firmness:  Excessive firmness can occur after surgery due to the internal scarring. This can also be due to fat necrosis. This occurrence is not predictable. Additional treatment or surgery may be required to treat this. Skin Sensation- Diminished (or loss) of skin sensation in the donor location for the flap as well as the location where the flap is placed may occur and not totally resolve after flap surgery. Skin flaps generally do not regain normal skin sensation. Injuries may occur secondary to this lack of sensation if the flap is subjected to excessive heat, cold, or physical force. Flaps placed in areas of decreased sensation are prone to injury and loss. Care must be given to avoid injury to these areas or complications may occur. Skin Contour Irregularities- Contour irregularities and depressions may occur after flap surgery. Visible and palpable wrinkling of skin can occur. Residual skin irregularities at the ends of the incisions or dog ears are always a possibility and may require additional surgery. This may improve with time, or it can be surgically corrected. Delayed Healing- Wound disruption or delayed wound healing is possible. Scarring and inadequate healing may occur in the location where the flap is taken for transfer to other parts of the body. Healing of the donor area may take unacceptably long periods of time. The donor area once healed may be prone to abrasions. The flap may heal abnormally or slowly. Some areas of the flap may die, requiring frequent dressing changes or further surgery to remove the non-healed tissue. Smokers have a greater risk of skin loss and wound healing complications.   Skin Discoloration / Swelling- Some bruising and swelling normally occurs following surgery. Flaps and the flap donor location can undergo changes in color. It is possible to have these areas be either darker or lighter than surrounding skin. These changes can be permanent. Once a flap is taken that muscle, skin, bone and tendon is permanently removed from that location, and there will be loss of function associated with the structures taken. Skin Sensitivity- Itching or other skin sensation changes can occur at the donor location and the recipient location. Flap disruption may occur from scratching. Additionally, these areas may have exaggerated responses to hot or cold temperatures. Usually this resolves during healing, but it may be chronic. Inability to Restore Function- In some situations, flaps cannot restore the normal function of intact skin or undamaged deeper structures. There can be a loss of function. Additional treatment and surgery may be necessary. Surgical Anesthesia- Both local and general anesthesia involves risk. There is the possibility of complications, injury, and even death from all forms of surgical anesthesia or sedation. Damage to Deeper Structures- There is the potential for injury to deeper structures including nerves, blood vessels, muscles, and lungs (pneumothorax) during any surgical procedure. The potential for this to occur varies according to where on the body surgery is being performed. Injury to deeper structures may be temporary or permanent. Allergic Reactions- In some cases, local allergies to tape, suture materials and glues, blood products, topical preparations or injected agents have been reported. Serious systemic reactions including shock (anaphylaxis) may occur to drugs used during surgery and prescription medications. Allergic reactions may require additional treatment. Skin Cancer - Skin cancer can sometimes occur in flaps. Pain- You will experience pain after your surgery.   Pain of varying intensity and duration may occur and persist after surgery. Chronic pain may occur from nerves becoming trapped in scar tissue or from other causes after flap surgery. Thrombosed Veins- Thrombosed veins, which resemble cords, occasionally develop in the area of the surgery, and resolve without medical or surgical treatment. Unusual Activities and Occupations- Activities and occupations that have the potential for trauma to the area could potentially break or damage any device or cause bleeding/seroma. Buried Surgical Staples / Sutures- Sutures and staples used to hold flap in place could potentially become buried under the skin during healing. Sutures may spontaneously poke through the skin, become visible or produce irritation that requires removal.  Additional surgery may be necessary to remove buried staples and sutures. Lacks flap Durability- Some flaps do not have the normal padding and durability of normal, undamaged skin. Flaps lack the normal ability of skin to resist ordinary abrasions and injuries. Shock- In some circumstances, your surgical procedure can cause severe trauma, particularly when multiple or extensive procedures are performed. Although serious complications are infrequent, infections or excessive fluid loss can lead to severe illness and even death. If surgical shock occurs, hospitalization and additional treatment would be necessary. Unsatisfactory Result- Although good results are expected, there is no guarantee or warranty expressed or implied, on the results that may be obtained. There is the possibility of a poor result from flap surgery. This would include risks such as unacceptable visible deformities, loss of function, poor healing, wound disruption, skin and soft tissue loss, chronic pain and loss of sensation. There may be unacceptable cosmetic deformities from flaps placed on visible portions of the body or in the flap donor areas. Abnormal color of flap may occur depending on its origin.   It may be necessary to perform additional surgery to improve your results. Cardiac and Pulmonary Complications- Surgery, especially longer procedures, may be associated with the formation of, or increase in, blood clots in the venous system. Pulmonary complications may occur secondarily to both blood clots (pulmonary emboli), fat deposits (fat emboli) or partial collapse of the lungs after general anesthesia. Pulmonary and fat emboli can be life-threatening or fatal in some circumstances. Air travel, inactivity and other conditions may increase the incidence of blood clots traveling to the lungs causing a major blood clot that may result in death. It is important to discuss with your physician any past history of blood clots or swollen legs that may contribute to this condition. Cardiac complications are a risk with any surgery and anesthesia, even in patients without symptoms. Should any of these complications occur, you may require hospitalization and additional treatment. *  Mental Health Disorders and Surgery- It is important that all patients seeking to undergo surgery have realistic expectations that focus on improvement rather than perfection. Complications or less than satisfactory results are sometimes unavoidable, may require additional surgery and often are stressful. Please openly discuss with your surgeon, prior to surgery, any history that you may have of significant emotional depression or mental health disorders. Although many individuals may benefit psychologically from the results of surgery, effects on mental health cannot be accurately predicted.     Smoking, Second-Hand Smoke Exposure, Nicotine Products (Patch, Gum, Nasal Spray)-   Patients who are currently smoking, use tobacco products, or nicotine products (patch, gum, or nasal spray) are at a greater risk for significant surgical complications of skin dying, increase risk of partial or complete flap loss, delayed healing, and additional scarring. Individuals exposed to second-hand smoke are also at potential risk for similar complications attributable to nicotine exposure. Additionally, smoking may have a significant negative effect on anesthesia and recovery from anesthesia, with coughing and possibly increased bleeding. Individuals who are not exposed to tobacco smoke or nicotine-containing products have a significantly lower risk of this type of complication  These images or illustration along with my medical records created in my case may be used for obtaining insurance approval, for use in examination, may assist in education, testing, credentialing and or certifying by [facility]. These images and records may be used to communicate with referring physician. Medications- There are many adverse reactions that occur as the result of taking over the counter, herbal, and/or prescription medications. Be sure to check with your physician about any drug interactions that may exist with medications which you are already taking. If you have an adverse reaction, stop the drugs immediately and call for further instructions. If the reaction is severe, go immediately to the nearest emergency room. When taking the prescribed pain medications after surgery, realize that they can affect your thought process and coordination. Do not drive, do not operate complex equipment, do not make any important decisions, and do not drink any alcohol while taking these medications. Be sure to take your prescribed medication only as directed. PATIENT COMPLIANCE   Follow all physician instructions carefully; this is essential for the success of your outcome. It is important that the flap is not subjected to excessive force, swelling, abrasion, or motion during the time of healing or flap loss may occur. flap donor locations are similarly vulnerable to injury during the healing process.   Protective dressings and drains should not be removed unless instructed by your plastic surgeon. Successful post-operative function depends on both surgery and subsequent rehabilitation. You may be advised to wear compressive garments, casts to control both swelling and protect the flap following surgery. Personal and vocational activity needs to be restricted. Physical activity that increases your pulse or heart rate may cause bruising, swelling, fluid accumulation and the need for return to surgery. It is wise to refrain from intimate physical activities after surgery until your physician states it is safe. It is important that you participate in follow-up care, return for aftercare, and promote your recovery after surgery. ADDITIONAL SURGERY NECESSARY  Should complications occur, additional surgery or other treatments may be necessary. Even though risks and complications occur infrequently, the risks cited are particularly associated with flap surgery. Other complications and risks can occur. The practice of medicine and surgery is not an exact science. Although good results are expected, there is no guarantee or warranty expressed or implied on the results that may be obtained. HEALTH INSURANCE  Most health insurance companies exclude coverage for cosmetic surgical operations or any complications that might occur from surgery. Please carefully review your health insurance subscriber information pamphlet. Most insurance plans exclude coverage for secondary or reversionary surgery. FINANCIAL RESPONSIBILITIES  The cost of surgery involves several charges for the services provided. The total includes fees charged by the surgeon, the cost of surgical supplies, anesthesia, laboratory tests, and possible outpatient hospital charge. Depending on whether the cost of surgery is covered by an insurance plan, you will be responsible for necessary co-payments, deductibles, and charges not covered.   The fees charged for this procedure do not include any potential future costs for additional procedures that you elect to have or require in order to revise, optimize, or complete your outcome. Additional costs may occur should complications develop from the surgery. Secondary surgery or hospital day-surgery charges involved with revision surgery will also be your responsibility. The following information was discussed with the patient, but this is not an all-inclusive-other issues were also discussed with patient. GENERAL INFORMATION  Skin graft surgery is frequently performed by plastic surgeons by using skin taken from one area of the body to restore skin coverage in other area(s). Skin grafts help wounds heal that otherwise would not heal adequately. Skin grafts are useful in situations where there is adequate subcutaneous tissues present to provide support and blood supply for the skin graft. Skin grafts are generally classified as to the thickness of the skin that is being grafted from one part of the body to some other region. A split-thickness skin graft does not comprise the entire thickness of skin. The donor area where the split-thickness graft is taken can heal on its own. Large areas of the body can be used for split-thickness skin grafts. The full thickness skin graft is different as it involves the full thickness of skin and deeper tissues. Full-thickness grafts tend to be used for specific wound coverage applications when thicker skin is needed. The donor area for the full thickness graft is limited in size as full-thickness skin graft donor sites cannot be used more than one time. Skin grafts are an effective means of assisting wound healing when there has been a loss of skin due to conditions that involve disease, injuries including burns, or surgical removal of tumors. Some wounds may be too complex to heal without other more involved reconstructive techniques.   In some situations, surgical procedure(s) and other treatments (dressing changes and hydrotherapy) may be needed to prepare a wound for a skin graft. ALTERNATIVE TREATMENTS  Alternative forms of care consist of not undergoing surgery. Some minor wounds may heal without surgery. In other situations, different forms of treatment such as the transfer of skin and other composite pieces of tissue may be preferable to skin grafts. Microsurgical tissue transfer may be necessary in situations when ordinary surgical techniques cannot provide for satisfactory tissue to cover a complex wound. Risks and potential complications are associated with alternative surgical forms of treatment. Although wounds can heal spontaneously, there may be increased risk of unsatisfactory result, scarring, and functional impairment. RISKS OF SKIN GRAFT SURGERY  Every surgical procedure involves a certain amount of risk and it is important that you understand these risks and the possible complications associated with them. In addition, every procedure has limitations. An individual's choice to undergo a surgical procedure is based on the comparison of the risk to potential benefit. Although the majority of patients do not experience these complications, you should discuss each of them with your plastic surgeon to make sure you understand all possible consequences of skin graft surgery. Bleeding- It is possible, though unusual, to experience a bleeding episode during or after surgery. Intraoperative blood transfusions may be required. Should post-operative bleeding occur, it may require an emergency treatment to drain the accumulated blood or blood transfusion. Do not take any aspirin or anti-inflammatory medications for ten days before or after surgery, as this may increase the risk of bleeding. Non-prescription herbs and dietary supplements can increase the risk of surgical bleeding. Hematoma can occur at any time following injury.   If blood transfusions are necessary to treat blood loss, there is the risk of blood-related infections such as hepatitis and HIV (AIDS). Heparin medications that are used to prevent blood clots in veins can produce bleeding and decreased blood platelets. Infection- Infections after skin graft surgery may occur. There is the possibility of skin graft failure or scarring from an infection. Should an infection occur, additional treatment including antibiotics, hospitalization, or additional surgery may be necessary. Inability to Heal- Conditions that involve disease, injuries including burns, or surgical removal of tumors can produce severe wounds. Skin grafts require adequate blood supply for survival.  Areas of the body where there is inadequate blood supply due to injury, disease states, or the effect of radiation therapy, may not be capable of providing adequate blood supply for skin graft survival.  Skin grafts are also vulnerable to loss in disease situations where there is a propensity for chronic swelling or vascular insufficiency disorders. Some wounds may be of the extent and severity that skin grafts cannot produce closure of the wound and healing. More involved reconstructive surgical procedures may be necessary. Scarring- All surgery leaves scars, some more visible than others. Although good wound healing after a surgical procedure is expected, abnormal scars may occur within the skin and deeper tissues. Scars may be unattractive and of different color than the surrounding skin tone. Scar appearance may also vary within the same scar. Special compressive garments may be needed to help control scarring. In some cases scars may require surgical revision or treatment. Skin Sensation- Diminished (or loss) of skin sensation in the donor location for the graft as well as the location where the graft is placed may occur and not totally resolve after skin graft surgery. Skin grafts generally do not regain normal skin sensation.   Injuries may occur secondary to this lack of sensation if the skin graft is subjected to excessive heat, cold, or physical force. Skin grafts placed in areas of decreased sensation are prone to injury and loss. Care must be given to avoid injury to these areas or complications may occur. Skin Contour Irregularities- Contour irregularities and depressions may occur after skin graft surgery. Visible and palpable wrinkling of skin can occur. If a skin graft has been processed in a graft meshing device, it may heal with a pattern. Residual skin irregularities at the ends of the incisions or dog ears are always a possibility and may require additional surgery. This may improve with time, or it can be surgically corrected. Delayed Healing- Wound disruption or delayed wound healing is possible. Scarring and inadequate healing may occur in the location where the skin graft is taken for transfer to other parts of the body. Healing of the donor area may take unacceptably long periods of time. The donor area once healed may be prone to abrasions. The skin graft may heal abnormally or slowly. Some areas of skin may die, requiring frequent dressing changes or further surgery to remove the non-healed tissue. Smokers have a greater risk of skin loss and wound healing complications. Skin Discoloration / Swelling- Some bruising and swelling normally occurs following surgery. Skin grafts and the skin graft donor location can undergo changes in color. It is possible to have these areas be either darker or lighter than surrounding skin. These changes can be permanent. Skin Sensitivity- Itching is a common complaint in both the skin graft donor location and the recipient location. Graft abrasion may occur from scratching. Additionally, these areas may have exaggerated responses to hot or cold temperatures. Usually this resolves during healing, but it may be chronic.   Inability to Restore Function- In some situations, skin grafts cannot restore the normal function of intact skin or undamaged deeper structures. Although it may be possible to produce healing with a skin graft, there can be a loss of function. Additional treatment and surgery may be necessary. Surgical Anesthesia- Both local and general anesthesia involve risk. There is the possibility of complications, injury, and even death from all forms of surgical anesthesia or sedation. Damage to Deeper Structures- There is the potential for injury to deeper structures including nerves, blood vessels, muscles, and lungs (pneumothorax) during any surgical procedure. The potential for this to occur varies according to where on the body surgery is being performed. Injury to deeper structures may be temporary or permanent. Allergic Reactions- In rare cases, local allergies to tape, suture materials and glues, blood products, topical preparations or injected agents have been reported. Serious systemic reactions including shock (anaphylaxis) may occur to drugs used during surgery and prescription medications. Allergic reactions may require additional treatment. Skin Cancer in Skin Grafts- Skin cancer can rarely occur in skin grafts. Pain- You will experience pain after your surgery. Pain of varying intensity and duration may occur and persist after surgery. Chronic pain may occur very infrequently from nerves becoming trapped in scar tissue or from other causes after skin graft surgery. Buried Surgical Staples / Sutures- Sutures and staples used to hold skin grafts in place can potentially become buried under the skin during healing. Sutures may spontaneously poke through the skin, become visible or produce irritation that requires removal.  Additional surgery may be necessary to remove buried staples and sutures. Lacks of Graft Durability- Skin grafts do not have the normal padding and durability of normal, undamaged skin.   Skin grafts lack the normal ability of skin to resist ordinary abrasions and injuries. Shock- In rare circumstances, your surgical procedure can cause severe trauma, particularly when multiple or extensive procedures are performed. Although serious complications are infrequent, infections or excessive fluid loss can lead to severe illness and even death. If surgical shock occurs, hospitalization and additional treatment would be necessary. Unsatisfactory Result- Although good results are expected, there is no guarantee or warranty expressed or implied, on the results that may be obtained. There is the possibility of a poor result from skin graft surgery. This would include risks such as unacceptable visible deformities, loss of function, poor healing, wound disruption, skin and soft tissue loss, chronic pain and loss of sensation. There may be unacceptable cosmetic deformities from skin grafts placed on visible portions of the body or in the skin graft donor areas. Abnormal color of skin graft and graft origin location may occur. It may be necessary to perform additional surgery to improve your results. Cardiac and Pulmonary Complications- Surgery, especially longer procedures, may be associated with the formation of, or increase in, blood clots in the venous system. Pulmonary complications may occur secondarily to both blood clots (pulmonary emboli), fat deposits (fat emboli) or partial collapse of the lungs after general anesthesia. Pulmonary and fat emboli can be life-threatening or fatal in some circumstances. Air travel, inactivity and other conditions may increase the incidence of blood clots traveling to the lungs causing a major blood clot that may result in death. It is important to discuss with your physician any past history of blood clots or swollen legs that may contribute to this condition. Cardiac complications are a risk with any surgery and anesthesia, even in patients without symptoms.   If you experience shortness of breath, chest pains, or unusual heart beats, seek medical attention immediately. Should any of these complications occur, you may require hospitalization and additional treatment. Obesity: If you're BMI is greater than 30 you may have a higher chance of complications. This may include but not limited to wound healing and infections. Also, if you have other medical problems such as diabetes and hypertension it may affect your healing as well as your surgical results in bleeding. Mental Health Disorders and Surgery- It is important that all patients seeking to undergo surgery have realistic expectations that focus on improvement rather than perfection. Complications or less than satisfactory results are sometimes unavoidable, may require additional surgery and often are stressful. Please openly discuss with your surgeon, prior to surgery, any history that you may have of significant emotional depression or mental health disorders. Although many individuals may benefit psychologically from the results of surgery, effects on mental health cannot be accurately predicted. Smoking, Second-Hand Smoke Exposure, Nicotine Products (Patch, Gum, Nasal Spray)-   Patients who are currently smoking, use tobacco products, or nicotine products (patch, gum, or nasal spray) are at a greater risk for significant surgical complications of skin dying, delayed healing, and additional scarring. Individuals exposed to second-hand smoke are also at potential risk for similar complications attributable to nicotine exposure. Additionally, smoking may have a significant negative effect on anesthesia and recovery from anesthesia, with coughing and possibly increased bleeding. Smokers also how significantly higher risk of pulmonary complications after surgery including but not limited to pneumonia. It is important to refrain from smoking at least 6 weeks before surgery and until your physician states it is safe to return, if desired. Medications- There are many adverse reactions that occur as the result of taking over the counter, herbal, and/or prescription medications. Be sure to check with your physician about any drug interactions that may exist with medications which you are already taking. If you have an adverse reaction, stop the drugs immediately and call your plastic surgeon for further instructions. If the reaction is severe, go immediately to the nearest emergency room. When taking the prescribed pain medications after surgery, realize that they can affect your thought process and coordination. Do not drive, do not operate complex equipment, do not make any important decisions, and do not drink any alcohol while taking these medications. Be sure to take your prescribed medication only as directed. PATIENT COMPLIANCE   Follow all physician instructions carefully; this is essential for the success of your outcome. It is important that the skin graft is not subjected to excessive force, swelling, abrasion, or motion during the time of healing or graft loss may occur. Skin graft donor locations are similarly vulnerable to injury during the healing process. Protective dressings and drains should not be removed unless instructed by your plastic surgeon. Successful post-operative function depends on both surgery and subsequent rehabilitation. You may be advised to wear compressive garments to control both swelling and scarring following skin graft surgery. Personal and vocational activity needs to be restricted. Physical activity that increases your pulse or heart rate may cause bruising, swelling, fluid accumulation and the need for return to surgery. It is wise to refrain from intimate physical activities after surgery until your physician states it is safe. It is important that you participate in follow-up care, return for aftercare, and promote your recovery after surgery.     ADDITIONAL SURGERY NECESSARY  Should complications occur, additional surgery or other treatments may be necessary. Even though risks and complications occur infrequently, the risks cited are particularly associated with skin graft surgery. Other complications and risks can occur but are even more uncommon. The practice of medicine and surgery is not an exact science. Although good results are expected, there is no guarantee or warranty expressed or implied on the results that may be obtained. HEALTH INSURANCE  Most health insurance tend to cover reconstructive procedures, however there is no guarantees or warrantees that the procedure will be covered by your insurance company. However, coverage for cosmetic surgical operations or any complications that might occur from surgery. Please carefully review your health insurance subscriber information pamphlet. Most insurance plans exclude coverage for secondary or revisionary surgery. FINANCIAL RESPONSIBILITIES  The cost of surgery involves several charges for the services provided. The total includes fees charged by the surgeon, the cost of surgical supplies, anesthesia, laboratory tests, and hospital charges. Depending on whether the cost of surgery is covered by an insurance plan, you will be responsible for necessary co-payments, deductibles, and charges not covered. The fees charged for this procedure do not include any potential future costs for additional procedures that you elect to have or require in order to revise, optimize, or complete your outcome. Additional costs may occur should complications develop from the surgery. Secondary surgery or hospital day-surgery charges involved with revision surgery will also be your responsibility.       Electronically signed by:  Christi Julian MD 11/17/2021

## 2021-11-17 NOTE — INTERVAL H&P NOTE
Update History & Physical    The patient's History and Physical of November 17, 2021 was reviewed with the patient and I examined the patient. There was no changes. The surgical site was confirmed by the patient and me. Plan: The risks, benefits, expected outcome, and alternative to the recommended procedure have been discussed with the patient. Patient understands and wants to proceed with the procedure.      Electronically signed by Merline Gomes MD on 11/17/2021 at 12:42 PM

## 2021-11-18 LAB
EKG ATRIAL RATE: 65 BPM
EKG P AXIS: 56 DEGREES
EKG P-R INTERVAL: 170 MS
EKG Q-T INTERVAL: 414 MS
EKG QRS DURATION: 88 MS
EKG QTC CALCULATION (BAZETT): 430 MS
EKG R AXIS: 23 DEGREES
EKG T AXIS: 31 DEGREES
EKG VENTRICULAR RATE: 65 BPM

## 2021-11-20 LAB — SURGICAL PATHOLOGY REPORT: NORMAL

## 2021-11-24 ENCOUNTER — OFFICE VISIT (OUTPATIENT)
Dept: SURGERY | Age: 71
End: 2021-11-24

## 2021-11-24 VITALS — RESPIRATION RATE: 12 BRPM | HEIGHT: 62 IN | WEIGHT: 157 LBS | BODY MASS INDEX: 28.89 KG/M2

## 2021-11-24 DIAGNOSIS — Z09 POSTOPERATIVE EXAMINATION: Primary | ICD-10-CM

## 2021-11-24 PROCEDURE — 99024 POSTOP FOLLOW-UP VISIT: CPT | Performed by: PLASTIC SURGERY

## 2021-11-24 NOTE — PROGRESS NOTES
[address] [facility]\"" SURGICAL SPECIALISTS  [facility] [address]       OFFICE POST-OP NOTE    Patient Name:  Cait Alexander    :  1950    MRN:  [id number]  STATUS POST  Chief Complaint   Patient presents with    Lesion(s)     [facility]       SUBJECTIVE  Patient seen and examined. Patient status post excision of forehead basal cell carcinoma on . The pathology results were discussed with the patient. A copy was provided to patient. PHYSICAL EXAM  Vital Signs:  Resp 12   Ht 5' 2\" (1.575 m)   Wt 157 lb (71.2 kg)   BMI 28.72 kg/m²     Incisions:  Suture line clean dry and intact. Skin:  No evidence of infection. Neurologic:  Alert & oriented x 3. Pathology:  Component 21 0913   Surgical Pathology Report -- Diagnosis --   1. SKIN, FOREHEAD, EXCISION:             -  HYPERTROPHIC SCAR AND CHRONIC INFLAMMATION, NEGATIVE FOR   MALIGNANCY.        -  SCAR EXTENDS TO 9-12-3:00 EDGE. 2. SKIN, FOREHEAD, RE-EXCISION 3-9 (SUPERIOR), EXCISION:        -  ACTINIC ELASTOSIS, NEGATIVE FOR MALIGNANCY. Lauren Zendejas M.D., [address] [place] [address]. AP/CP, Dermatopathology   **Electronically Signed Out**   jet/2021         Clinical Information   Pre-op Diagnosis: BASAL CELL CARCINOMA FOREHEAD, FOREHEAD BASAL CELL   LESION BIOPSY EXCISION POSSIBLE GRAFT POSSIBLE FLAP WITH FROZEN   SECTION PATHOLOGY REQUIRED   Operative Findings:  EXCISION BASAL CELL CARCINOMA  SUTURE AT 12:00;   RE-EXCISION 3-9  SUTURE AT NEW 12:00   Operation Performed:  EXCISION, POSSIBLE GRAFT, POSSIBLE FLAP WITH   FROZEN SECTION     Source of Specimen   1: EXCISION FOREHEAD   2: REEXCISION FOREHEAD     Gross Description   1.  \"FORTUNATO ONEILL, EXCISION BASAL CELL CARCINOMA  SUTURE AT 12:00\"   Received fresh is a 2.0 x 1.2 x 0.3 cm oriented skin ellipse with a   suture designating 12:00.  The 12-3 o'clock quadrant is inked red, 3-6   o'clock green, 6-9 o'clock blue and 9-12 o'clock black. Serially   section from 3:00 to 9:00.  2FS, cassette summary:  \"A\" 12-3-6 half,   \"B\" 6-9-12 half. 2.  \"FORTUNATO ONEILL, RE-EXCISION 3-9  SUTURE AT NEW 12:00\" Received   fresh is a 3.0 x 0.2 x 0.2 cm slim strip of oriented tan skin with a   suture designating 12:00 along 1 edge in the midportion of the strip. Location clarified with surgeon as 9-12-3 re-excised margin.  True   9-12:00 margin inked green, 12:00 red, 12-3 blue, 3:00 tip red.  2FS,   cassette summary:  \"A\" 9-12:00 en face, \"B\" 12-3:00 en face.  tm     Intraoperative Diagnosis   1.  FSDX:  Scar (extends to 9-12-3:00 edge), negative for malignancy. 2.  FSDX:  Negative for malignancy.  (JET)     Microscopic Description   1, 2.  Microscopic examination performed. SURGICAL PATHOLOGY CONSULTATION         Patient Name: Marium Katz Med Rec: [id number]   Path Number: [id number]     [facility]   [facility]   ANATOMIC PATHOLOGY   [address] [place] [address], [place] [address]. [place], [address]   [phone]   Fax: [phone]          ASSESSMENT   Diagnosis Orders   1. Postoperative examination         PLAN  1. We will place Mastisol and Steri-Strips. We will plan for suture removal next week. Plan discussed with patient.     Electronically signed by:  Adrianne Crum M.D.   11/24/2021

## 2021-12-01 ENCOUNTER — OFFICE VISIT (OUTPATIENT)
Dept: SURGERY | Age: 71
End: 2021-12-01

## 2021-12-01 VITALS — HEIGHT: 62 IN | BODY MASS INDEX: 28.89 KG/M2 | OXYGEN SATURATION: 100 % | RESPIRATION RATE: 12 BRPM | WEIGHT: 157 LBS

## 2021-12-01 DIAGNOSIS — Z09 POSTOPERATIVE EXAMINATION: Primary | ICD-10-CM

## 2021-12-01 PROCEDURE — 99024 POSTOP FOLLOW-UP VISIT: CPT | Performed by: PLASTIC SURGERY

## 2021-12-01 NOTE — PROGRESS NOTES
[address] [facility] SURGICAL SPECIALISTS  [facility] [address]       OFFICE POST-OP NOTE    Patient Name:  Menea Peterson    :  1950    MRN:  [id number]  STATUS POST  Chief Complaint   Patient presents with    Post-Op Check     [address] forehead DOS 21       SUBJECTIVE  Patient seen and examined. Patient status post excision of forehead basal cell carcinoma on 21. PHYSICAL EXAM  Vital Signs:  Resp 12   Ht 5' 2\" (1.575 m)   Wt 157 lb (71.2 kg)   SpO2 100%   BMI 28.72 kg/m²     Incisions:  Suture line clean dry and intact. Patient has healed well. Skin:  No evidence of infection. Neurologic:  Alert & oriented x 3. Pathology:  Component 21 0913   Surgical Pathology Report -- Diagnosis --   1. SKIN, FOREHEAD, EXCISION:             -  HYPERTROPHIC SCAR AND CHRONIC INFLAMMATION, NEGATIVE FOR   MALIGNANCY.        -  SCAR EXTENDS TO 9-12-3:00 EDGE. 2. SKIN, FOREHEAD, RE-EXCISION 3-9 (SUPERIOR), EXCISION:        -  ACTINIC ELASTOSIS, NEGATIVE FOR MALIGNANCY. Lisa Duran M.D., [address] [place] [address]. AP/CP, Dermatopathology   **Electronically Signed Out**   jet/2021         Clinical Information   Pre-op Diagnosis: BASAL CELL CARCINOMA FOREHEAD, FOREHEAD BASAL CELL   LESION BIOPSY EXCISION POSSIBLE GRAFT POSSIBLE FLAP WITH FROZEN   SECTION PATHOLOGY REQUIRED   Operative Findings:  EXCISION BASAL CELL CARCINOMA  SUTURE AT 12:00;   RE-EXCISION 3-9  SUTURE AT NEW 12:00   Operation Performed:  EXCISION, POSSIBLE GRAFT, POSSIBLE FLAP WITH   FROZEN SECTION     Source of Specimen   1: EXCISION FOREHEAD   2: REEXCISION FOREHEAD     Gross Description   1.  \"FORTUNATO ONEILL, EXCISION BASAL CELL CARCINOMA  SUTURE AT 12:00\"   Received fresh is a 2.0 x 1.2 x 0.3 cm oriented skin ellipse with a   suture designating 12:00. The 12-3 o'clock quadrant is inked red, 3-6   o'clock green, 6-9 o'clock blue and 9-12 o'clock black. Serially   section from 3:00 to 9:00.  2FS, cassette summary:  \"A\" 12-3-6 half,   \"B\" 6-9-12 half. 2.  \"FORTUNATO ONEILL, RE-EXCISION 3-9  SUTURE AT NEW 12:00\" Received   fresh is a 3.0 x 0.2 x 0.2 cm slim strip of oriented tan skin with a   suture designating 12:00 along 1 edge in the midportion of the strip. Location clarified with surgeon as 9-12-3 re-excised margin.  True   9-12:00 margin inked green, 12:00 red, 12-3 blue, 3:00 tip red.  2FS,   cassette summary:  \"A\" 9-12:00 en face, \"B\" 12-3:00 en face.  tm     Intraoperative Diagnosis   1.  FSDX:  Scar (extends to 9-12-3:00 edge), negative for malignancy. 2.  FSDX:  Negative for malignancy.  (JET)     Microscopic Description   1, 2.  Microscopic examination performed. SURGICAL PATHOLOGY CONSULTATION         Patient Name: Abigail [facility] Rec: [id number]   Path Number: [id number]     [facility]   [facility]   ANATOMIC PATHOLOGY   [address] [place] [address], [facility] [address]. [place], [address]   [phone]   Fax: [phone]          ASSESSMENT   Diagnosis Orders   1. Postoperative examination         PLAN  We will remove the sutures  We will place Mastisol strips strips  Patient is unable to return for 1 month for a follow-up appointment. We will see the patient in 1 month. Plan discussed with patient.     Electronically signed by:  Rashmi Reyes M.D.   12/1/2021

## 2021-12-27 DIAGNOSIS — M54.2 NECK PAIN: ICD-10-CM

## 2021-12-27 RX ORDER — CYCLOBENZAPRINE HCL 10 MG
TABLET ORAL
Qty: 30 TABLET | Refills: 35 | Status: SHIPPED | OUTPATIENT
Start: 2021-12-27

## 2022-01-12 ENCOUNTER — OFFICE VISIT (OUTPATIENT)
Dept: SURGERY | Age: 72
End: 2022-01-12

## 2022-01-12 VITALS
RESPIRATION RATE: 12 BRPM | HEIGHT: 62 IN | HEART RATE: 90 BPM | BODY MASS INDEX: 28.89 KG/M2 | OXYGEN SATURATION: 100 % | WEIGHT: 157 LBS

## 2022-01-12 DIAGNOSIS — Z09 POSTOPERATIVE EXAMINATION: Primary | ICD-10-CM

## 2022-01-12 PROCEDURE — 99024 POSTOP FOLLOW-UP VISIT: CPT | Performed by: PLASTIC SURGERY

## 2022-01-12 NOTE — PROGRESS NOTES
[address] [facility] SURGICAL SPECIALISTS  [facility] [address]       OFFICE POST-OP NOTE    Patient Name:  Kira Sutton    :  1950    MRN:  [id number]  STATUS POST  Chief Complaint   Patient presents with    Follow-up     [facility] forehead DOS 21       SUBJECTIVE  Patient seen and examined. Patient status post excision of forehead basal cell carcinoma on 21. A copy of the pathology result was reviewed and was we provided to the patient    PHYSICAL EXAM  Vital Signs:  Pulse 90   Resp 12   Ht 5' 2\" (1.575 m)   Wt 157 lb (71.2 kg)   SpO2 100%   BMI 28.72 kg/m²     Incisions:  Suture line clean dry and intact. There may be a cyst in the middle of the suture line  Skin:  No evidence of infection. Neurologic:  Alert & oriented x 3. Pathology:  Component 21 0913   Surgical Pathology Report -- Diagnosis --   1. SKIN, FOREHEAD, EXCISION:             -  HYPERTROPHIC SCAR AND CHRONIC INFLAMMATION, NEGATIVE FOR   MALIGNANCY.        -  SCAR EXTENDS TO 9-12-3:00 EDGE. 2. SKIN, FOREHEAD, RE-EXCISION 3-9 (SUPERIOR), EXCISION:        -  ACTINIC ELASTOSIS, NEGATIVE FOR MALIGNANCY. Dinesh Tate M.D., [address] [place] [address]. AP/CP, Dermatopathology   **Electronically Signed Out**   jet/2021         Clinical Information   Pre-op Diagnosis: BASAL CELL CARCINOMA FOREHEAD, FOREHEAD BASAL CELL   LESION BIOPSY EXCISION POSSIBLE GRAFT POSSIBLE FLAP WITH FROZEN   SECTION PATHOLOGY REQUIRED   Operative Findings:  EXCISION BASAL CELL CARCINOMA  SUTURE AT 12:00;   RE-EXCISION 3-9  SUTURE AT NEW 12:00   Operation Performed:  EXCISION, POSSIBLE GRAFT, POSSIBLE FLAP WITH   FROZEN SECTION     Source of Specimen   1: EXCISION FOREHEAD   2: REEXCISION FOREHEAD     Gross Description   1.  \"FORTUNATO ONEILL, EXCISION BASAL CELL CARCINOMA  SUTURE AT 12:00\"   Received fresh is a 2.0 x 1.2 x 0.3 cm oriented skin ellipse with a   suture designating 12:00.  The 12-3 o'clock quadrant is inked red, 3-6   o'clock green, 6-9 o'clock blue and 9-12 o'clock black. Serially   section from 3:00 to 9:00.  2FS, cassette summary:  \"A\" 12-3-6 half,   \"B\" 6-9-12 half. 2.  \"FORTUNATO ONEILL, RE-EXCISION 3-9  SUTURE AT NEW 12:00\" Received   fresh is a 3.0 x 0.2 x 0.2 cm slim strip of oriented tan skin with a   suture designating 12:00 along 1 edge in the midportion of the strip. Location clarified with surgeon as 9-12-3 re-excised margin.  True   9-12:00 margin inked green, 12:00 red, 12-3 blue, 3:00 tip red.  2FS,   cassette summary:  \"A\" 9-12:00 en face, \"B\" 12-3:00 en face.  tm     Intraoperative Diagnosis   1.  FSDX:  Scar (extends to 9-12-3:00 edge), negative for malignancy. 2.  FSDX:  Negative for malignancy.  (JET)     Microscopic Description   1, 2.  Microscopic examination performed. SURGICAL PATHOLOGY CONSULTATION         Patient Name: Johanna Su Med Rec: [id number]   Path Number: [id number]     [facility]   [facility] PATHOLOGISTS [facility]   ANATOMIC PATHOLOGY   [address] [place] [address], [place] [address]. [place], [address]   [phone]   Fax: [phone]          ASSESSMENT   Diagnosis Orders   1. Postoperative examination         PLAN  Patient may form a cyst around one of the internal sutures patient is to massage the area  Patient is unable to return for 1 month for a follow-up appointment. We will see the patient in 1 month. Plan discussed with patient.     Electronically signed by:  Lynn Buck M.D.   1/12/2022

## 2022-04-25 ENCOUNTER — OFFICE VISIT (OUTPATIENT)
Dept: PRIMARY CARE CLINIC | Age: 72
End: 2022-04-25
Payer: MEDICARE

## 2022-04-25 VITALS
HEART RATE: 75 BPM | HEIGHT: 62 IN | SYSTOLIC BLOOD PRESSURE: 180 MMHG | BODY MASS INDEX: 28.89 KG/M2 | DIASTOLIC BLOOD PRESSURE: 84 MMHG | WEIGHT: 157 LBS | OXYGEN SATURATION: 98 %

## 2022-04-25 DIAGNOSIS — Z00.00 INITIAL MEDICARE ANNUAL WELLNESS VISIT: Primary | ICD-10-CM

## 2022-04-25 DIAGNOSIS — E78.5 HYPERLIPIDEMIA, UNSPECIFIED HYPERLIPIDEMIA TYPE: ICD-10-CM

## 2022-04-25 DIAGNOSIS — J45.20 MILD INTERMITTENT ASTHMA, UNSPECIFIED WHETHER COMPLICATED: ICD-10-CM

## 2022-04-25 DIAGNOSIS — R07.9 CHEST PAIN, UNSPECIFIED TYPE: ICD-10-CM

## 2022-04-25 DIAGNOSIS — I10 ESSENTIAL (PRIMARY) HYPERTENSION: ICD-10-CM

## 2022-04-25 DIAGNOSIS — R07.9 LEFT-SIDED CHEST PAIN: ICD-10-CM

## 2022-04-25 DIAGNOSIS — I71.9 AORTIC ANEURYSM WITHOUT RUPTURE, UNSPECIFIED PORTION OF AORTA (HCC): ICD-10-CM

## 2022-04-25 DIAGNOSIS — Q21.12 PFO (PATENT FORAMEN OVALE): ICD-10-CM

## 2022-04-25 DIAGNOSIS — I63.9 CEREBROVASCULAR ACCIDENT (CVA), UNSPECIFIED MECHANISM (HCC): ICD-10-CM

## 2022-04-25 DIAGNOSIS — G45.9 TIA (TRANSIENT ISCHEMIC ATTACK): ICD-10-CM

## 2022-04-25 DIAGNOSIS — Z87.891 PERSONAL HISTORY OF TOBACCO USE: ICD-10-CM

## 2022-04-25 PROCEDURE — 4040F PNEUMOC VAC/ADMIN/RCVD: CPT | Performed by: PHYSICIAN ASSISTANT

## 2022-04-25 PROCEDURE — G0438 PPPS, INITIAL VISIT: HCPCS | Performed by: PHYSICIAN ASSISTANT

## 2022-04-25 PROCEDURE — 3017F COLORECTAL CA SCREEN DOC REV: CPT | Performed by: PHYSICIAN ASSISTANT

## 2022-04-25 PROCEDURE — G0296 VISIT TO DETERM LDCT ELIG: HCPCS | Performed by: PHYSICIAN ASSISTANT

## 2022-04-25 PROCEDURE — 1123F ACP DISCUSS/DSCN MKR DOCD: CPT | Performed by: PHYSICIAN ASSISTANT

## 2022-04-25 RX ORDER — ATORVASTATIN CALCIUM 20 MG/1
20 TABLET, FILM COATED ORAL DAILY
Qty: 90 TABLET | Refills: 1 | Status: SHIPPED | OUTPATIENT
Start: 2022-04-25 | End: 2022-04-26

## 2022-04-25 RX ORDER — ALBUTEROL SULFATE 90 UG/1
2 AEROSOL, METERED RESPIRATORY (INHALATION) 4 TIMES DAILY PRN
Qty: 18 G | Refills: 0 | Status: SHIPPED | OUTPATIENT
Start: 2022-04-25 | End: 2022-04-26

## 2022-04-25 RX ORDER — PREDNISONE 10 MG/1
10 TABLET ORAL 2 TIMES DAILY
Qty: 10 TABLET | Refills: 0 | Status: SHIPPED | OUTPATIENT
Start: 2022-04-25 | End: 2022-04-30

## 2022-04-25 RX ORDER — AMLODIPINE BESYLATE 10 MG/1
10 TABLET ORAL DAILY
Qty: 30 TABLET | Refills: 0 | Status: SHIPPED | OUTPATIENT
Start: 2022-04-25 | End: 2022-04-26

## 2022-04-25 ASSESSMENT — PATIENT HEALTH QUESTIONNAIRE - PHQ9
4. FEELING TIRED OR HAVING LITTLE ENERGY: 0
SUM OF ALL RESPONSES TO PHQ QUESTIONS 1-9: 0
SUM OF ALL RESPONSES TO PHQ QUESTIONS 1-9: 0
9. THOUGHTS THAT YOU WOULD BE BETTER OFF DEAD, OR OF HURTING YOURSELF: 0
SUM OF ALL RESPONSES TO PHQ9 QUESTIONS 1 & 2: 0
1. LITTLE INTEREST OR PLEASURE IN DOING THINGS: 0
SUM OF ALL RESPONSES TO PHQ QUESTIONS 1-9: 0
8. MOVING OR SPEAKING SO SLOWLY THAT OTHER PEOPLE COULD HAVE NOTICED. OR THE OPPOSITE, BEING SO FIGETY OR RESTLESS THAT YOU HAVE BEEN MOVING AROUND A LOT MORE THAN USUAL: 0
DEPRESSION UNABLE TO ASSESS: PT REFUSES
6. FEELING BAD ABOUT YOURSELF - OR THAT YOU ARE A FAILURE OR HAVE LET YOURSELF OR YOUR FAMILY DOWN: 0
10. IF YOU CHECKED OFF ANY PROBLEMS, HOW DIFFICULT HAVE THESE PROBLEMS MADE IT FOR YOU TO DO YOUR WORK, TAKE CARE OF THINGS AT HOME, OR GET ALONG WITH OTHER PEOPLE: 0
5. POOR APPETITE OR OVEREATING: 0
7. TROUBLE CONCENTRATING ON THINGS, SUCH AS READING THE NEWSPAPER OR WATCHING TELEVISION: 0
3. TROUBLE FALLING OR STAYING ASLEEP: 0
SUM OF ALL RESPONSES TO PHQ QUESTIONS 1-9: 0
2. FEELING DOWN, DEPRESSED OR HOPELESS: 0

## 2022-04-25 ASSESSMENT — COLUMBIA-SUICIDE SEVERITY RATING SCALE - C-SSRS
1. WITHIN THE PAST MONTH, HAVE YOU WISHED YOU WERE DEAD OR WISHED YOU COULD GO TO SLEEP AND NOT WAKE UP?: NO
6. HAVE YOU EVER DONE ANYTHING, STARTED TO DO ANYTHING, OR PREPARED TO DO ANYTHING TO END YOUR LIFE?: NO
2. HAVE YOU ACTUALLY HAD ANY THOUGHTS OF KILLING YOURSELF?: NO

## 2022-04-25 NOTE — PATIENT INSTRUCTIONS
Personalized Preventive Plan for Nadia  - 4/25/2022  Medicare offers a range of preventive health benefits. Some of the tests and screenings are paid in full while other may be subject to a deductible, co-insurance, and/or copay. Some of these benefits include a comprehensive review of your medical history including lifestyle, illnesses that may run in your family, and various assessments and screenings as appropriate. After reviewing your medical record and screening and assessments performed today your provider may have ordered immunizations, labs, imaging, and/or referrals for you. A list of these orders (if applicable) as well as your Preventive Care list are included within your After Visit Summary for your review. Other Preventive Recommendations:    · A preventive eye exam performed by an eye specialist is recommended every 1-2 years to screen for glaucoma; cataracts, macular degeneration, and other eye disorders. · A preventive dental visit is recommended every 6 months. · Try to get at least 150 minutes of exercise per week or 10,000 steps per day on a pedometer . · Order or download the FREE \"Exercise & Physical Activity: Your Everyday Guide\" from [employer]. Call 5-991.892.4906 or search [employer] online. · You need 1251-9139 mg of calcium and 7448-0329 IU of vitamin D per day. It is possible to meet your calcium requirement with diet alone, but a vitamin D supplement is usually necessary to meet this goal.  · When exposed to the sun, use a sunscreen that protects against both UVA and UVB radiation with an SPF of 30 or greater. Reapply every 2 to 3 hours or after sweating, drying off with a towel, or swimming. · Always wear a seat belt when traveling in a car. Always wear a helmet when riding a bicycle or motorcycle. What is lung cancer screening?   Lung cancer screening is a way in which doctors check the lungs for early signs of cancer in people who have no symptoms of lung cancer. A low-dose CT scan uses much less radiation than a normal CT scan and shows a more detailed image of the lungs than a standard X-ray. The goal of lung cancer screening is to find cancer early, before it has a chance to grow, spread, or cause problems. One large study found that smokers who were screened with low-dose CT scans were less likely to die of lung cancer than those who were screened with standard X-ray. Below is a summary of the things you need to know regarding screening for lung cancer with low-dose computed tomography (LDCT). This is a screening program that involves routine annual screening with LDCT studies of the lung. The LDCTs are done using low-dose radiation that is not thought to increase your cancer risk. If you have other serious medical conditions (other cancers, congestive heart failure) that limit your life expectancy to less than 10 years, you should not undergo lung cancer screening with LDCT. The chance is 20%-60% that the LDCT result will show abnormalities. This would require additional testing which could include repeat imaging or even invasive procedures. Most (about 95%) of \"abnormal\" LDCT results are false in the sense that no lung cancer is ultimately found. Additionally, some (about 10%) of the cancers found would not affect your life expectancy, even if undetected and untreated. If you are still smoking, the single most important thing that you can do to reduce your risk of dying of lung cancer is to quit. For this screening to be covered by Medicare and most other insurers, strict criteria must be met. If you do not meet these criteria, but still wish to undergo LDCT testing, you will be required to sign a waiver indicating your willingness to pay for the scan.

## 2022-04-25 NOTE — PROGRESS NOTES
Medicare Annual Wellness Visit    Adrian Tamayo is here for Medicare AWV and Headache (Shock to heart )    Assessment & Plan   Initial Medicare annual wellness visit  Aortic aneurysm without rupture, unspecified portion of aorta (HCC)  -     AFL([facility]) - Shania Mcqueen MD, Cardiology, [place]  Chest pain, unspecified type  -     AFL([facility]) - Shania Mcqueen MD, Cardiology, [place]  Cerebrovascular accident (CVA), unspecified mechanism ([address]  -     Benjamin Caba MD, Neurology, [place]  Hyperlipidemia, unspecified hyperlipidemia type  -     atorvastatin (LIPITOR) 20 MG tablet; Take 1 tablet by mouth daily, Disp-90 tablet, R-1Normal  Mild intermittent asthma, unspecified whether complicated  -     albuterol sulfate HFA (VENTOLIN HFA) 108 (90 Base) MCG/ACT inhaler; Inhale 2 puffs into the lungs 4 times daily as needed for Wheezing, Disp-18 g, R-0Normal  -     mometasone-formoterol (DULERA) 100-5 MCG/ACT inhaler; Inhale 2 puffs into the lungs 2 times daily, Disp-13 g, R-0Normal  Essential (primary) hypertension  -     amLODIPine (NORVASC) 10 MG tablet; Take 1 tablet by mouth daily, Disp-30 tablet, R-0Normal  Left-sided chest pain  -     XR CHEST (2 VW); Future  -     predniSONE (DELTASONE) 10 MG tablet; Take 1 tablet by mouth 2 times daily for 5 days, Disp-10 tablet, R-0Normal  PFO (patent foramen ovale)  TIA (transient ischemic attack)  Personal history of tobacco use  -     KS VISIT TO DISCUSS LUNG CA SCREEN W LDCT  -     CT Lung Screen (Annual); Future      Recommendations for Preventive Services Due: see orders and patient instructions/AVS.  Recommended screening schedule for the next 5-10 years is provided to the patient in written form: see Patient Instructions/AVS.     Return for Medicare Annual Wellness Visit in 1 year. Subjective   The following acute and/or chronic problems were also addressed today:  The patient is having sharp pains going up arm. Feels like electric shots.  Has had multiple strokes. 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 Seeing eye doctor but not seeing neurology or cardiology. Chest pain with shocks and sharp left sided pain happening more and more. Hs had strokes in the past. Not seeing neurology. She is taking plavix but not aspirin. Aspirin gives her ulcers. Has stopped smoking. Having high blood pressure. It is high at home as well. Patient's complete Health Risk Assessment and screening values have been reviewed and are found in Flowsheets. The following problems were reviewed today and where indicated follow up appointments were made and/or referrals ordered. Positive Risk Factor Screenings with Interventions:      Depression:  Depression Unable to Assess: Pt refuses  PHQ-2 Score: 0  PHQ-9 Total Score: 0    Severity:1-4 = minimal depression, 5-9 = mild depression, 10-14 = moderate depression, 15-19 = moderately severe depression, 20-27 = severe depression       Substance Use - Tobacco Interventions:  has quit smoking. Health Habits/Nutrition:     Physical Activity: Inactive    Days of Exercise per Week: 0 days    Minutes of Exercise per Session: 0 min     Have you lost any weight without trying in the past 3 months?: (!) Yes  Body mass index: (!) 28.71  Have you seen the dentist within the past year?: N/A - wear dentures    Health Habits/Nutrition Interventions:  · declines evaluation for this. Does not want [facility].      Hearing/Vision:  Do you or your family notice any trouble with your hearing that hasn't been managed with hearing aids?: No  Do you have difficulty driving, watching TV, or doing any of your daily activities because of your eyesight?: (!) Yes  Have you had an eye exam within the past year?: Appointment is scheduled  No exam data present    Hearing/Vision Interventions:  · Vision concerns:  patient declines any further evaluation/treatment for this issue Seeing eye specialist.             Objective   Vitals: 04/25/22 1321   BP: (!) 180/84   Pulse: 75   SpO2: 98%   Weight: 157 lb (71.2 kg)   Height: 5' 2\" (1.575 m)      Body mass index is 28.72 kg/m². General Appearance: alert and oriented to person, place and time, well developed and well- nourished, in no acute distress  Skin: warm and dry, no rash or erythema  Head: normocephalic and atraumatic  Eyes: pupils equal, round, and reactive to light, extraocular eye movements intact, conjunctivae normal  ENT: tympanic membrane, external ear and ear canal normal bilaterally, nose without deformity, nasal mucosa and turbinates normal without polyps  Neck: supple and non-tender without mass, no thyromegaly or thyroid nodules, no cervical lymphadenopathy  Pulmonary/Chest: clear to auscultation bilaterally- no wheezes, rales or rhonchi, normal air movement, no respiratory distress  Cardiovascular: normal rate, regular rhythm, normal S1 and S2, no murmurs, rubs, clicks, or gallops, distal pulses intact, no carotid bruits  Abdomen: soft, non-tender, non-distended, normal bowel sounds, no masses or organomegaly  Extremities: no cyanosis, clubbing or edema  Musculoskeletal: normal range of motion, no joint swelling, deformity or tenderness  Neurologic: reflexes normal and symmetric, no cranial nerve deficit, gait, coordination and speech normal       Allergies   Allergen Reactions    Adhesive Tape     Codeine Rash     Prior to Visit Medications    Medication Sig Taking?  Authorizing Provider   atorvastatin (LIPITOR) 20 MG tablet Take 1 tablet by mouth daily Yes DAPHNIE Enamorado   albuterol sulfate HFA (VENTOLIN HFA) 108 (90 Base) MCG/ACT inhaler Inhale 2 puffs into the lungs 4 times daily as needed for Wheezing Yes DAPHNIE Enamorado   mometasone-formoterol (DULERA) 100-5 MCG/ACT inhaler Inhale 2 puffs into the lungs 2 times daily Yes DAPHNIE Enamorado   amLODIPine (NORVASC) 10 MG tablet Take 1 tablet by mouth daily Yes DAPHNIE Enamorado   predniSONE (Rayma Blamer) 10 MG tablet Take 1 tablet by mouth 2 times daily for 5 days Yes DAPHNIE Reilly   cyclobenzaprine (FLEXERIL) 10 MG tablet TAKE 1 TABLET THREE TIMES A DAY AS NEEDED FOR MUSCLE SPASMS Yes DAPHNIE Reilly   omeprazole (PRILOSEC) 40 MG delayed release capsule  Yes Historical Provider, MD   vitamin B-12 (CYANOCOBALAMIN) 1000 MCG tablet Take 1,000 mcg by mouth daily Yes Historical Provider, MD   Cholecalciferol (VITAMIN D3 PO) Take 1,000 mg by mouth Yes Historical Provider, MD   clopidogrel (PLAVIX) 75 MG tablet Take 75 mg by mouth daily. Yes Historical Provider, MD       CareTeam (Including outside providers/suppliers regularly involved in providing care):   Patient Care Team:  DAPHNIE Reilly as PCP - General (Physician Assistant)  DAPHNIE Reilly as PCP - [facility]  Bartolome Peguero MD as Surgeon (Cardiothoracic Surgery)  Tacos Stephenson MD (Cardiology)    Reviewed and updated this visit:  Allergies  Meds                  Low Dose CT (LDCT) Lung Screening criteria met:     Age 50-77(Medicare) or 50-80 (USPSTF)   Pack year smoking >20   Still smoking or less than 15 year since quit   No sign or symptoms of lung cancer   > 11 months since last LDCT     Risks and benefits of lung cancer screening with LDCT scans discussed:    Significance of positive screen - False-positive LDCT results often occur. 95% of all positive results do not lead to a diagnosis of cancer. Usually further imaging can resolve most false-positive results; however, some patients may require invasive procedures. Over diagnosis risk - 10% to 12% of screen-detected lung cancer cases are over diagnosed--that is, the cancer would not have been detected in the patient's lifetime without the screening.     Need for follow up screens annually to continue lung cancer screening effectiveness     Risks associated with radiation from annual LDCT- Radiation exposure is about the same as for a mammogram, which is about 1/3 of the annual background radiation exposure from everyday life. Starting screening at age 54 is not likely to increase cancer risk from radiation exposure. Patients with comorbidities resulting in life expectancy of < 10 years, or that would preclude treatment of an abnormality identified on CT, should not be screened due to lack of benefit.     To obtain maximal benefit from this screening, smoking cessation and long-term abstinence from smoking is critical

## 2022-04-26 ENCOUNTER — TELEPHONE (OUTPATIENT)
Dept: PRIMARY CARE CLINIC | Age: 72
End: 2022-04-26

## 2022-04-26 DIAGNOSIS — J45.20 MILD INTERMITTENT ASTHMA, UNSPECIFIED WHETHER COMPLICATED: ICD-10-CM

## 2022-04-26 RX ORDER — ATORVASTATIN CALCIUM 20 MG/1
20 TABLET, FILM COATED ORAL DAILY
Qty: 90 TABLET | Refills: 1 | Status: SHIPPED | OUTPATIENT
Start: 2022-04-26

## 2022-04-26 RX ORDER — AMLODIPINE BESYLATE 10 MG/1
10 TABLET ORAL DAILY
Qty: 90 TABLET | Refills: 1 | Status: SHIPPED | OUTPATIENT
Start: 2022-04-26 | End: 2022-05-20

## 2022-04-26 RX ORDER — ALBUTEROL SULFATE 90 UG/1
2 AEROSOL, METERED RESPIRATORY (INHALATION) 4 TIMES DAILY PRN
Qty: 18 G | Refills: 2 | Status: SHIPPED | OUTPATIENT
Start: 2022-04-26 | End: 2022-07-19 | Stop reason: SDUPTHER

## 2022-04-26 NOTE — TELEPHONE ENCOUNTER
Pt asking for all her meds that were sent in yesterday [facility] even the prednisone.

## 2022-04-26 NOTE — TELEPHONE ENCOUNTER
I have sent in all medications to [facility] for 90-day supplies. Prednisone is only a 5-day prescription patient must pick it up at pharmacy.

## 2022-04-28 ENCOUNTER — TELEPHONE (OUTPATIENT)
Dept: PRIMARY CARE CLINIC | Age: 72
End: 2022-04-28

## 2022-04-28 NOTE — TELEPHONE ENCOUNTER
Got her schedule with Dr. Ambrosio Kirkland in August. [facility] for her told her to call and let us know if that was okay.

## 2022-04-28 NOTE — TELEPHONE ENCOUNTER
----- Message from Kay Anand sent at 4/26/2022 11:11 AM EDT -----  Subject: Referral Request    QUESTIONS   Reason for referral request? patient has a referral to see Dr Tawnya Carrillo and   cannot not see until Oct and would like another referral to see someone   else   Has the physician seen you for this condition before? Yes  Select a date? 2022-04-25  Select the Provider the patient wants to be referred to, if known (PCP or   Specialist)? Outside Physician - Dr. Tawnya Carrillo   Preferred Specialist (if applicable)? Do you already have an appointment scheduled? No  Additional Information for Provider?   ---------------------------------------------------------------------------  --------------  CALL BACK INFO  What is the best way for the office to contact you? OK to leave message on   voicemail  Preferred Call Back Phone Number? [phone]  ---------------------------------------------------------------------------  --------------  SCRIPT ANSWERS  Relationship to Patient?  Self

## 2022-04-29 DIAGNOSIS — Z87.891 PERSONAL HISTORY OF TOBACCO USE: ICD-10-CM

## 2022-04-29 DIAGNOSIS — R07.9 LEFT-SIDED CHEST PAIN: ICD-10-CM

## 2022-04-29 NOTE — RESULT ENCOUNTER NOTE
Call and advise patient that the results showed emphysema, otherwise normal continue with yearly screens.

## 2022-05-20 ENCOUNTER — TELEPHONE (OUTPATIENT)
Dept: PRIMARY CARE CLINIC | Age: 72
End: 2022-05-20

## 2022-05-20 ENCOUNTER — OFFICE VISIT (OUTPATIENT)
Dept: PRIMARY CARE CLINIC | Age: 72
End: 2022-05-20
Payer: MEDICARE

## 2022-05-20 VITALS
DIASTOLIC BLOOD PRESSURE: 80 MMHG | OXYGEN SATURATION: 98 % | BODY MASS INDEX: 27.96 KG/M2 | HEART RATE: 83 BPM | WEIGHT: 157.8 LBS | HEIGHT: 63 IN | SYSTOLIC BLOOD PRESSURE: 160 MMHG

## 2022-05-20 DIAGNOSIS — R01.1 MURMUR: ICD-10-CM

## 2022-05-20 DIAGNOSIS — I10 ESSENTIAL (PRIMARY) HYPERTENSION: Primary | ICD-10-CM

## 2022-05-20 DIAGNOSIS — K21.9 GASTROESOPHAGEAL REFLUX DISEASE, UNSPECIFIED WHETHER ESOPHAGITIS PRESENT: ICD-10-CM

## 2022-05-20 PROCEDURE — 3017F COLORECTAL CA SCREEN DOC REV: CPT | Performed by: PHYSICIAN ASSISTANT

## 2022-05-20 PROCEDURE — 99214 OFFICE O/P EST MOD 30 MIN: CPT | Performed by: PHYSICIAN ASSISTANT

## 2022-05-20 PROCEDURE — G8427 DOCREV CUR MEDS BY ELIG CLIN: HCPCS | Performed by: PHYSICIAN ASSISTANT

## 2022-05-20 PROCEDURE — 1090F PRES/ABSN URINE INCON ASSESS: CPT | Performed by: PHYSICIAN ASSISTANT

## 2022-05-20 PROCEDURE — 1123F ACP DISCUSS/DSCN MKR DOCD: CPT | Performed by: PHYSICIAN ASSISTANT

## 2022-05-20 PROCEDURE — G8400 PT W/DXA NO RESULTS DOC: HCPCS | Performed by: PHYSICIAN ASSISTANT

## 2022-05-20 PROCEDURE — 1036F TOBACCO NON-USER: CPT | Performed by: PHYSICIAN ASSISTANT

## 2022-05-20 PROCEDURE — G8417 CALC BMI ABV UP PARAM F/U: HCPCS | Performed by: PHYSICIAN ASSISTANT

## 2022-05-20 RX ORDER — AMLODIPINE BESYLATE 10 MG/1
10 TABLET ORAL DAILY
Qty: 90 TABLET | Refills: 1 | Status: SHIPPED | OUTPATIENT
Start: 2022-05-20

## 2022-05-20 RX ORDER — HYDRALAZINE HYDROCHLORIDE 10 MG/1
25 TABLET, FILM COATED ORAL ONCE
Status: COMPLETED | OUTPATIENT
Start: 2022-05-20 | End: 2022-05-20

## 2022-05-20 RX ORDER — OMEPRAZOLE 40 MG/1
40 CAPSULE, DELAYED RELEASE ORAL DAILY
Qty: 90 CAPSULE | Refills: 1 | Status: SHIPPED | OUTPATIENT
Start: 2022-05-20

## 2022-05-20 RX ADMIN — HYDRALAZINE HYDROCHLORIDE 25 MG: 10 TABLET, FILM COATED ORAL at 16:08

## 2022-05-20 ASSESSMENT — ENCOUNTER SYMPTOMS
DIARRHEA: 0
SORE THROAT: 0
EYE DISCHARGE: 0
PHOTOPHOBIA: 0
SHORTNESS OF BREATH: 0
VOMITING: 0
COUGH: 0
CHEST TIGHTNESS: 0
SINUS PRESSURE: 0
CONSTIPATION: 0
ABDOMINAL PAIN: 0
RHINORRHEA: 0
ABDOMINAL DISTENTION: 0

## 2022-05-20 NOTE — PROGRESS NOTES
[facility]  [address] [address]  Dept: [phone]    Kylie Gomez is a 80 y.o. female Established patient, who presents today for her medical conditions/complaints as noted below. Chief Complaint   Patient presents with    Hypertension     Follow up        HPI:     HPI: Patient is a 78-year-old female with a past medical history of TIA, aneurysm, CAD, hypertension. Patient with elevated BP at office visit yesterday at [facility]. Was getting Holter and echo. Refused ER and hospital at that time and today. Was seen by CT and cardiology. Refuses health maintenence. Has been taking 20 mg of amlodipine per self. Patient currently on Holter monitor. Patient states\" if it is my time to go why stop it\". Having headaches, vision changes since stroke. Has history of strokes. Reviewed prior notes None  Reviewed previous Labs    LDL Calculated (mg/dL)   Date Value   09/02/2021 153       (goal LDL is <100)   AST (U/L)   Date Value   01/09/2014 21     ALT (U/L)   Date Value   01/09/2014 13     BUN (mg/dL)   Date Value   01/11/2014 12     BP Readings from Last 3 Encounters:   05/20/22 (!) 194/100   04/29/22 (!) 146/84   04/25/22 (!) 180/84          (goal 120/80)    Past Medical History:   Diagnosis Date    Arthritic-like pain     Arthritis     Blind left eye     CAD (coronary artery disease)     Cancer ([facility] [address]     uterus,lt kidney    Chest pain 1/8/14    Chronic kidney disease     DVT (deep venous thrombosis) ([facility])     Rt. upper arm    Full dentures     GERD (gastroesophageal reflux disease)     Hyperlipidemia     Hypertension     Lazy eye     Rt.     Osteoporosis     Peptic ulcer     PFO (patent foramen ovale) 1/10/14    closure of PFO    Stroke syndrome     x8 sept 2020 last one    TIA (transient ischemic attack)     Wears glasses       Past Surgical History:   Procedure Laterality Date    APPENDECTOMY  BACK SURGERY      fussion L5    CARDIAC CATHETERIZATION      PFO repaired    CARPAL TUNNEL RELEASE      COLONOSCOPY      EXCISION/BIOPSY  11/17/2021     FOREHEAD BASAL CELL LESION BIOPSY EXCISION possible GRAFT possible FLAP * WITH FROZEN SECTION PATHOLOGY REQUIRED    FACIAL SURGERY N/A 11/17/2021    FOREHEAD BASAL CELL LESION BIOPSY EXCISION possible GRAFT possible FLAP * WITH FROZEN SECTION PATHOLOGY REQUIRED performed by Rebekah Evangelista MD at [id number] [address] HAND SURGERY Right     thumb rt    HYSTERECTOMY      KIDNEY REMOVAL Left     PARTIAL NEPHRECTOMY      SPLENECTOMY, TOTAL      THORACOTOMY  1/10/14     min.  Inv. PFO closure    TONSILLECTOMY         Family History   Problem Relation Age of Onset    Diabetes Mother     Asthma Mother     Heart Failure Mother         MI    Heart Failure Father         pacemaker    Rheum Arthritis Father     Diabetes Sister     Diabetes Sister     Diabetes Brother     Cancer Sister         hyst    Heart Failure Brother         MI       Social History     Tobacco Use    Smoking status: Former Smoker     Packs/day: 0.50     Years: 48.00     Pack years: 24.00     Types: Cigarettes    Smokeless tobacco: Never Used    Tobacco comment: trying to quit, down to 3-4 cig./day   Substance Use Topics    Alcohol use: No      Current Outpatient Medications   Medication Sig Dispense Refill    amLODIPine (NORVASC) 10 MG tablet Take 1 tablet by mouth daily 90 tablet 1    omeprazole (PRILOSEC) 40 MG delayed release capsule Take 1 capsule by mouth daily 90 capsule 1    atorvastatin (LIPITOR) 20 MG tablet Take 1 tablet by mouth daily 90 tablet 1    albuterol sulfate HFA (VENTOLIN HFA) 108 (90 Base) MCG/ACT inhaler Inhale 2 puffs into the lungs 4 times daily as needed for Wheezing 18 g 2    mometasone-formoterol (DULERA) 100-5 MCG/ACT inhaler Inhale 2 puffs into the lungs 2 times daily 3 each 3    cyclobenzaprine (FLEXERIL) 10 MG tablet TAKE 1 TABLET THREE TIMES A DAY AS NEEDED FOR MUSCLE SPASMS 30 tablet 35    vitamin B-12 (CYANOCOBALAMIN) 1000 MCG tablet Take 1,000 mcg by mouth daily      Cholecalciferol (VITAMIN D3 PO) Take 1,000 mg by mouth      clopidogrel (PLAVIX) 75 MG tablet Take 75 mg by mouth daily. No current facility-administered medications for this visit. Allergies   Allergen Reactions    Adhesive Tape     Codeine Rash       Health Maintenance   Topic Date Due    Meningococcal (ACWY) vaccine (1 - Risk start 2-23 months series) Never done    Hib vaccine (1 of 1 - Risk 1-dose series) Never done    Pneumococcal 65+ years Vaccine (1 - PCV) Never done    Meningococcal B vaccine (1 of 4 - Increased Risk Bexsero 2-dose series) Never done    Hepatitis C screen  Never done    DTaP/Tdap/Td vaccine (1 - Tdap) Never done    Colorectal Cancer Screen  Never done    Breast cancer screen  Never done    Shingles vaccine (1 of 2) Never done    DEXA (modify frequency per FRAX score)  Never done    Flu vaccine (Season Ended) 09/01/2022    Lipids  09/02/2022    Depression Screen  04/25/2023    Annual Wellness Visit (AWV)  04/26/2023    COVID-19 Vaccine  Completed    Hepatitis A vaccine  Aged Out    Hepatitis B vaccine  Aged Out       Subjective:      Review of Systems   Constitutional: Negative for chills, fever and unexpected weight change. HENT: Negative for congestion, hearing loss, rhinorrhea, sinus pressure and sore throat. Eyes: Negative for photophobia, discharge and visual disturbance. Respiratory: Negative for cough, chest tightness and shortness of breath. Cardiovascular: Negative for chest pain, palpitations and leg swelling. Gastrointestinal: Negative for abdominal distention, abdominal pain, constipation, diarrhea and vomiting. Endocrine: Negative for polydipsia and polyuria. Genitourinary: Negative for decreased urine volume, difficulty urinating, frequency and urgency.    Musculoskeletal: Negative for arthralgias, gait problem and myalgias. Skin: Negative for rash. Allergic/Immunologic: Negative for food allergies. Neurological: Negative for dizziness, weakness, numbness and headaches. Hematological: Negative for adenopathy. Psychiatric/Behavioral: Negative for dysphoric mood and sleep disturbance. The patient is not nervous/anxious. Objective:     BP (!) 194/100   Pulse 81   Ht 5' 3\" (1.6 m)   Wt 157 lb 12.8 oz (71.6 kg)   SpO2 98%   BMI 27.95 kg/m²   Physical Exam  Constitutional:       General: She is not in acute distress. Appearance: Normal appearance. She is not ill-appearing. HENT:      Head: Normocephalic and atraumatic. Right Ear: Tympanic membrane, ear canal and external ear normal.      Left Ear: Tympanic membrane, ear canal and external ear normal.      Nose: Nose normal.      Mouth/Throat:      Mouth: Mucous membranes are moist.   Eyes:      Extraocular Movements: Extraocular movements intact. Conjunctiva/sclera: Conjunctivae normal.      Pupils: Pupils are equal, round, and reactive to light. Neck:      Vascular: No carotid bruit. Cardiovascular:      Rate and Rhythm: Normal rate and regular rhythm. Pulses: Normal pulses. Heart sounds: Normal heart sounds. Pulmonary:      Effort: Pulmonary effort is normal. No respiratory distress. Breath sounds: Normal breath sounds. Abdominal:      General: Bowel sounds are normal. There is no distension. Tenderness: There is no abdominal tenderness. Musculoskeletal:         General: Normal range of motion. Cervical back: Normal range of motion and neck supple. Lymphadenopathy:      Cervical: No cervical adenopathy. Skin:     General: Skin is warm and dry. Neurological:      General: No focal deficit present. Mental Status: She is alert and oriented to person, place, and time. Psychiatric:         Mood and Affect: Mood normal.         Behavior: Behavior normal.         Thought Content:  Thought content normal.         Assessment and Plan:          1. Essential (primary) hypertension  -     hydrALAZINE (APRESOLINE) tablet 25 mg; 25 mg, Oral, ONCE, 1 dose, On Fri 5/20/22 at 1630  -     amLODIPine (NORVASC) 10 MG tablet; Take 1 tablet by mouth daily, Disp-90 tablet, R-1Normal  2. Gastroesophageal reflux disease, unspecified whether esophagitis present  -     omeprazole (PRILOSEC) 40 MG delayed release capsule; Take 1 capsule by mouth daily, Disp-90 capsule, R-1Normal     Patient was adamant she was taking a 20 mg pill of amlodipine. I explained to the patient that amlodipine does not come in 20 mg and after discussion with the patient it was determined that patient was mistaking amlodipine for atorvastatin. Patient realizes now she has not been taking amlodipine at all. Patient states she has some 10 mg tablets from her  left at home. Patient will begin to take these and I have sent in a new prescription of amlodipine for patient. Patient educated if blood pressure remains elevated or reaches over 180/100 again that she is to go to the ER. Educated patient on signs of stroke as well as if patient has worst headache of her life with elevated blood pressure to go to the ER. Patient given educational materials - see patient instructions. Discussed use, benefit, and side effects of prescribed medications. All patient questions answered. Pt voiced understanding. Reviewed health maintenance. Instructed to continue current medications, diet and exercise. Patient agreed with treatment plan. Follow up as directed.      Electronically signed by DAPHNIE Valdez on 5/20/2022 at 4:24 PM

## 2022-05-20 NOTE — TELEPHONE ENCOUNTER
Patient called office c/o high blood pressure. Patient states she was switched to Amlodipine 20 mg daily (In her med list it states 10 mg daily)       Patient states she has been taking 20 mg and blood pressure is still high. Patient states she went to have heart testing done yesterday and was told her blood pressure was over 250 and needed to be seen in ER. Patient refused. Patient states she does check blood pressure daily and she has not seen it under \"150\"    Patient states she took her blood pressure the night before heart testing and BP was 175/101    Patient states this am BP was 178/98     Patient states she is currently on holter monitor     Patient c/o headache and \"feeling sick to stomach\" Patient denies dizziness.     Please advise

## 2022-05-20 NOTE — TELEPHONE ENCOUNTER
Okay to book patient at 4:00 today patient is taking too much amlodipine and BP too high will need BW completed before appointment in order to see if she can tolerate new medication.

## 2022-06-13 RX ORDER — CLOPIDOGREL BISULFATE 75 MG/1
75 TABLET ORAL DAILY
Qty: 90 TABLET | Refills: 3 | Status: SHIPPED | OUTPATIENT
Start: 2022-06-13

## 2022-07-19 DIAGNOSIS — J45.20 MILD INTERMITTENT ASTHMA, UNSPECIFIED WHETHER COMPLICATED: ICD-10-CM

## 2022-07-19 RX ORDER — ALBUTEROL SULFATE 90 UG/1
2 AEROSOL, METERED RESPIRATORY (INHALATION) 4 TIMES DAILY PRN
Qty: 3 EACH | Refills: 3 | Status: SHIPPED | OUTPATIENT
Start: 2022-07-19

## 2022-08-22 ENCOUNTER — TELEPHONE (OUTPATIENT)
Dept: PRIMARY CARE CLINIC | Age: 72
End: 2022-08-22

## 2022-08-22 DIAGNOSIS — J45.20 MILD INTERMITTENT ASTHMA WITHOUT COMPLICATION: Primary | ICD-10-CM

## 2022-10-04 ENCOUNTER — TELEPHONE (OUTPATIENT)
Dept: PRIMARY CARE CLINIC | Age: 72
End: 2022-10-04

## 2022-10-04 DIAGNOSIS — J45.20 MILD INTERMITTENT ASTHMA WITHOUT COMPLICATION: Primary | ICD-10-CM

## 2022-10-04 RX ORDER — ALBUTEROL SULFATE 2.5 MG/3ML
2.5 SOLUTION RESPIRATORY (INHALATION) EVERY 6 HOURS PRN
Qty: 100 EACH | Refills: 2 | Status: SHIPPED | OUTPATIENT
Start: 2022-10-04 | End: 2022-11-03

## 2022-10-04 NOTE — TELEPHONE ENCOUNTER
Pt states that she is out of the albuterol Solution that goes in her [employer]. Needs it sent in to [facility]. Asking if you could note on rx to rush the order? Did not see it on current, or med hx.

## 2022-12-06 ENCOUNTER — HOSPITAL ENCOUNTER (OUTPATIENT)
Age: 72
Discharge: HOME | End: 2022-12-08
Payer: MEDICARE

## 2022-12-06 ENCOUNTER — HOSPITAL ENCOUNTER (OUTPATIENT)
Dept: GENERAL RADIOLOGY | Age: 72
Discharge: HOME | End: 2022-12-08
Payer: MEDICARE

## 2022-12-06 ENCOUNTER — OFFICE VISIT (OUTPATIENT)
Dept: PRIMARY CARE CLINIC | Age: 72
End: 2022-12-06
Payer: MEDICARE

## 2022-12-06 ENCOUNTER — HOSPITAL ENCOUNTER (OUTPATIENT)
Dept: CT IMAGING | Age: 72
Discharge: HOME | End: 2022-12-08
Payer: MEDICARE

## 2022-12-06 ENCOUNTER — HOSPITAL ENCOUNTER (OUTPATIENT)
Age: 72
Discharge: HOME | End: 2022-12-06
Payer: MEDICARE

## 2022-12-06 VITALS
HEART RATE: 76 BPM | DIASTOLIC BLOOD PRESSURE: 70 MMHG | SYSTOLIC BLOOD PRESSURE: 120 MMHG | BODY MASS INDEX: 29.05 KG/M2 | WEIGHT: 164 LBS | OXYGEN SATURATION: 98 %

## 2022-12-06 DIAGNOSIS — R07.81 RIB PAIN: ICD-10-CM

## 2022-12-06 DIAGNOSIS — R06.01 ORTHOPNEA: ICD-10-CM

## 2022-12-06 DIAGNOSIS — R06.02 SOB (SHORTNESS OF BREATH): ICD-10-CM

## 2022-12-06 DIAGNOSIS — R06.02 SOB (SHORTNESS OF BREATH): Primary | ICD-10-CM

## 2022-12-06 DIAGNOSIS — R07.89 CHEST HEAVINESS: ICD-10-CM

## 2022-12-06 LAB
EGFR, POC: >60 ML/MIN/1.73M2
POC CREATININE: 0.69 MG/DL (ref 0.51–1.19)

## 2022-12-06 PROCEDURE — 2580000003 HC RX 258: Performed by: PHYSICIAN ASSISTANT

## 2022-12-06 PROCEDURE — 1036F TOBACCO NON-USER: CPT | Performed by: PHYSICIAN ASSISTANT

## 2022-12-06 PROCEDURE — G8427 DOCREV CUR MEDS BY ELIG CLIN: HCPCS | Performed by: PHYSICIAN ASSISTANT

## 2022-12-06 PROCEDURE — 82565 ASSAY OF CREATININE: CPT

## 2022-12-06 PROCEDURE — 3017F COLORECTAL CA SCREEN DOC REV: CPT | Performed by: PHYSICIAN ASSISTANT

## 2022-12-06 PROCEDURE — 1123F ACP DISCUSS/DSCN MKR DOCD: CPT | Performed by: PHYSICIAN ASSISTANT

## 2022-12-06 PROCEDURE — 93000 ELECTROCARDIOGRAM COMPLETE: CPT | Performed by: PHYSICIAN ASSISTANT

## 2022-12-06 PROCEDURE — G8484 FLU IMMUNIZE NO ADMIN: HCPCS | Performed by: PHYSICIAN ASSISTANT

## 2022-12-06 PROCEDURE — 6360000004 HC RX CONTRAST MEDICATION: Performed by: PHYSICIAN ASSISTANT

## 2022-12-06 PROCEDURE — 99214 OFFICE O/P EST MOD 30 MIN: CPT | Performed by: PHYSICIAN ASSISTANT

## 2022-12-06 PROCEDURE — 71101 X-RAY EXAM UNILAT RIBS/CHEST: CPT

## 2022-12-06 PROCEDURE — 71260 CT THORAX DX C+: CPT

## 2022-12-06 PROCEDURE — 1090F PRES/ABSN URINE INCON ASSESS: CPT | Performed by: PHYSICIAN ASSISTANT

## 2022-12-06 PROCEDURE — G8400 PT W/DXA NO RESULTS DOC: HCPCS | Performed by: PHYSICIAN ASSISTANT

## 2022-12-06 PROCEDURE — G8417 CALC BMI ABV UP PARAM F/U: HCPCS | Performed by: PHYSICIAN ASSISTANT

## 2022-12-06 RX ORDER — ATORVASTATIN CALCIUM 40 MG/1
TABLET, FILM COATED ORAL
COMMUNITY
Start: 2022-09-29

## 2022-12-06 RX ORDER — 0.9 % SODIUM CHLORIDE 0.9 %
80 INTRAVENOUS SOLUTION INTRAVENOUS ONCE
Status: COMPLETED | OUTPATIENT
Start: 2022-12-06 | End: 2022-12-06

## 2022-12-06 RX ORDER — SODIUM CHLORIDE 0.9 % (FLUSH) 0.9 %
10 SYRINGE (ML) INJECTION PRN
Status: DISCONTINUED | OUTPATIENT
Start: 2022-12-06 | End: 2022-12-09 | Stop reason: HOSPADM

## 2022-12-06 RX ADMIN — SODIUM CHLORIDE, PRESERVATIVE FREE 10 ML: 5 INJECTION INTRAVENOUS at 17:28

## 2022-12-06 RX ADMIN — SODIUM CHLORIDE 80 ML: 9 INJECTION, SOLUTION INTRAVENOUS at 17:28

## 2022-12-06 RX ADMIN — IOPAMIDOL 75 ML: 755 INJECTION, SOLUTION INTRAVENOUS at 17:28

## 2022-12-06 SDOH — ECONOMIC STABILITY: FOOD INSECURITY: WITHIN THE PAST 12 MONTHS, YOU WORRIED THAT YOUR FOOD WOULD RUN OUT BEFORE YOU GOT MONEY TO BUY MORE.: NEVER TRUE

## 2022-12-06 SDOH — ECONOMIC STABILITY: FOOD INSECURITY: WITHIN THE PAST 12 MONTHS, THE FOOD YOU BOUGHT JUST DIDN'T LAST AND YOU DIDN'T HAVE MONEY TO GET MORE.: NEVER TRUE

## 2022-12-06 ASSESSMENT — ENCOUNTER SYMPTOMS
DIARRHEA: 0
COUGH: 0
ABDOMINAL DISTENTION: 0
SHORTNESS OF BREATH: 0
RHINORRHEA: 0
ABDOMINAL PAIN: 0
SORE THROAT: 0
PHOTOPHOBIA: 0
SINUS PRESSURE: 0
CONSTIPATION: 0
EYE DISCHARGE: 0
VOMITING: 0
CHEST TIGHTNESS: 0

## 2022-12-06 ASSESSMENT — SOCIAL DETERMINANTS OF HEALTH (SDOH): HOW HARD IS IT FOR YOU TO PAY FOR THE VERY BASICS LIKE FOOD, HOUSING, MEDICAL CARE, AND HEATING?: NOT HARD AT ALL

## 2022-12-06 NOTE — PROGRESS NOTES
[address] [place] [address] PRIMARY CARE  [address] [facility]  [address] [address]  Dept: [phone]    Godwin Toth is a 71 y.o. female Established patient, who presents today for her medical conditions/complaints as noted below. Chief Complaint   Patient presents with    Chest Pain     Pt states she is having SOB and weight on left side of chest. She states she usually 'pops\" her ribs, she states this is different x1 week. HPI:     HPI: The patient has been having SOB and felt like she poped a rib. Feels like a concrete block sitting on that side of her chest. Has to sit up to breath. Felt the pop two sundays ago. 1 week ago with the sob. Was waiting 4-5 hours. No fevers. Does follow heart doctor. Reviewed prior notes None  Reviewed previous Labs    LDL Calculated (mg/dL)   Date Value   09/02/2021 153       (goal LDL is <100)   AST (U/L)   Date Value   01/09/2014 21     ALT (U/L)   Date Value   01/09/2014 13     BUN (mg/dL)   Date Value   01/11/2014 12     BP Readings from Last 3 Encounters:   12/06/22 120/70   05/20/22 (!) 160/80   04/29/22 (!) 146/84          (goal 120/80)  No results found for: LABA1C  Past Medical History:   Diagnosis Date    Arthritic-like pain     Arthritis     Blind left eye     CAD (coronary artery disease)     Cancer (HCC)     uterus,lt kidney    Chest pain 1/8/14    Chronic kidney disease     DVT (deep venous thrombosis) (HCC)     Rt. upper arm    Full dentures     GERD (gastroesophageal reflux disease)     Hyperlipidemia     Hypertension     Lazy eye     Rt.     Osteoporosis     Peptic ulcer     PFO (patent foramen ovale) 1/10/14    closure of PFO    Stroke syndrome     x8 sept 2020 last one    TIA (transient ischemic attack)     Wears glasses       Past Surgical History:   Procedure Laterality Date    APPENDECTOMY      BACK SURGERY      fussion L5    CARDIAC CATHETERIZATION      PFO repaired    CARPAL TUNNEL RELEASE      COLONOSCOPY EXCISION/BIOPSY  11/17/2021     FOREHEAD BASAL CELL LESION BIOPSY EXCISION possible GRAFT possible FLAP * WITH FROZEN SECTION PATHOLOGY REQUIRED    FACIAL SURGERY N/A 11/17/2021    FOREHEAD BASAL CELL LESION BIOPSY EXCISION possible GRAFT possible FLAP * WITH FROZEN SECTION PATHOLOGY REQUIRED performed by Sherrell Abernathy MD at [place] [id number] Right     thumb rt    HYSTERECTOMY      NEPHRECTOMY Left     PARTIAL NEPHRECTOMY      SPLENECTOMY, TOTAL      THORACOTOMY  1/10/14     min.  Inv. PFO closure    TONSILLECTOMY         Family History   Problem Relation Age of Onset    Diabetes Mother     Asthma Mother     Heart Failure Mother         MI    Heart Failure Father         pacemaker    Rheum Arthritis Father     Diabetes Sister     Diabetes Sister     Diabetes Brother     Cancer Sister         hyst    Heart Failure Brother         MI       Social History     Tobacco Use    Smoking status: Former     Packs/day: 0.50     Years: 48.00     Pack years: 24.00     Types: Cigarettes    Smokeless tobacco: Never    Tobacco comments:     trying to quit, down to 3-4 cig./day   Substance Use Topics    Alcohol use: No      Current Outpatient Medications   Medication Sig Dispense Refill    atorvastatin (LIPITOR) 40 MG tablet       albuterol (PROVENTIL) (2.5 MG/3ML) 0.083% nebulizer solution Take 3 mLs by nebulization every 6 hours as needed for Wheezing 100 each 2    albuterol sulfate HFA (VENTOLIN HFA) 108 (90 Base) MCG/ACT inhaler Inhale 2 puffs into the lungs 4 times daily as needed for Wheezing 3 each 3    clopidogrel (PLAVIX) 75 MG tablet Take 1 tablet by mouth daily 90 tablet 3    amLODIPine (NORVASC) 10 MG tablet Take 1 tablet by mouth daily 90 tablet 1    omeprazole (PRILOSEC) 40 MG delayed release capsule Take 1 capsule by mouth daily 90 capsule 1    mometasone-formoterol (DULERA) 100-5 MCG/ACT inhaler Inhale 2 puffs into the lungs 2 times daily 3 each 3    cyclobenzaprine (FLEXERIL) 10 MG tablet TAKE 1 TABLET THREE TIMES A DAY AS NEEDED FOR MUSCLE SPASMS 30 tablet 35    vitamin B-12 (CYANOCOBALAMIN) 1000 MCG tablet Take 1,000 mcg by mouth daily      Cholecalciferol (VITAMIN D3 PO) Take 1,000 mg by mouth       No current facility-administered medications for this visit. Allergies   Allergen Reactions    Adhesive Tape     Gabapentin     Codeine Rash       Health Maintenance   Topic Date Due    Meningococcal (ACWY) vaccine (1 - Risk start 2-23 months series) Never done    Hib vaccine (1 of 1 - Risk 1-dose series) Never done    Pneumococcal 65+ years Vaccine (1 - PCV) Never done    Meningococcal B vaccine (1 of 4 - Increased Risk Bexsero 2-dose series) Never done    Hepatitis C screen  Never done    DTaP/Tdap/Td vaccine (1 - Tdap) Never done    Shingles vaccine (1 of 2) Never done    Colorectal Cancer Screen  Never done    Breast cancer screen  Never done    DEXA (modify frequency per FRAX score)  Never done    COVID-19 Vaccine (5 - Booster for Pfizer series) 07/14/2022    Flu vaccine (1) Never done    Lipids  09/02/2022    Depression Screen  04/25/2023    Annual Wellness Visit (AWV)  04/26/2023    Hepatitis A vaccine  Aged Out       Subjective:      Review of Systems   Constitutional:  Negative for chills, fever and unexpected weight change. HENT:  Negative for congestion, hearing loss, rhinorrhea, sinus pressure and sore throat. Eyes:  Negative for photophobia, discharge and visual disturbance. Respiratory:  Negative for cough, chest tightness and shortness of breath. Cardiovascular:  Negative for chest pain, palpitations and leg swelling. Gastrointestinal:  Negative for abdominal distention, abdominal pain, constipation, diarrhea and vomiting. Endocrine: Negative for polydipsia and polyuria. Genitourinary:  Negative for decreased urine volume, difficulty urinating, frequency and urgency. Musculoskeletal:  Negative for arthralgias, gait problem and myalgias. Skin:  Negative for rash. Allergic/Immunologic: Negative for food allergies. Neurological:  Negative for dizziness, weakness, numbness and headaches. Hematological:  Negative for adenopathy. Psychiatric/Behavioral:  Negative for dysphoric mood and sleep disturbance. The patient is not nervous/anxious. Objective:     /70   Pulse 76   Wt 164 lb (74.4 kg)   SpO2 98%   BMI 29.05 kg/m²   Physical Exam  Constitutional:       General: She is not in acute distress. Appearance: Normal appearance. She is not ill-appearing. HENT:      Head: Normocephalic and atraumatic. Right Ear: External ear normal.      Left Ear: External ear normal.      Nose: Nose normal.      Mouth/Throat:      Mouth: Mucous membranes are moist.   Neck:      Vascular: No carotid bruit. Cardiovascular:      Rate and Rhythm: Normal rate and regular rhythm. Pulses: Normal pulses. Heart sounds: Normal heart sounds. Pulmonary:      Effort: Pulmonary effort is normal. No respiratory distress. Breath sounds: Normal breath sounds. No wheezing or rhonchi. Chest:      Chest wall: Tenderness present. Musculoskeletal:         General: Tenderness (Left ribs under breast.) present. Normal range of motion. Cervical back: Normal range of motion and neck supple. Lymphadenopathy:      Cervical: No cervical adenopathy. Skin:     General: Skin is warm and dry. Neurological:      General: No focal deficit present. Mental Status: She is alert and oriented to person, place, and time. Psychiatric:         Mood and Affect: Mood normal.         Behavior: Behavior normal.         Thought Content: Thought content normal.       Assessment and Plan:          1. SOB (shortness of breath)  -     CT CHEST W CONTRAST; Future  -     EKG 12 lead; Future  -     Troponin; Future  -     CBC with Auto Differential; Future  -     Brain Natriuretic Peptide; Future  -     Comprehensive Metabolic Panel;  Future  -     D-Dimer, Quantitative; Future  2. Chest heaviness  -     CT CHEST W CONTRAST; Future  -     EKG 12 lead; Future  -     Troponin; Future  -     CBC with Auto Differential; Future  -     Brain Natriuretic Peptide; Future  -     Comprehensive Metabolic Panel; Future  -     D-Dimer, Quantitative; Future  3. Orthopnea  -     CT CHEST W CONTRAST; Future  -     EKG 12 lead; Future  -     Troponin; Future  -     CBC with Auto Differential; Future  -     Brain Natriuretic Peptide; Future  -     Comprehensive Metabolic Panel; Future  -     D-Dimer, Quantitative; Future           No follow-ups on file. Patient given educational materials - see patient instructions. Discussed use, benefit, and side effects of prescribed medications. All patient questions answered. Pt voiced understanding. Reviewed health maintenance. Instructed to continue current medications, diet and exercise. Patient agreed with treatment plan. Follow up as directed.      Electronically signed by DAPHNIE Sam on 12/6/2022 at 4:06 PM

## 2022-12-07 NOTE — RESULT ENCOUNTER NOTE
Call and advise patient that the results showed two broken ribs at area of pain. Continue to try to take deep breaths and avoid laying flat if causing increased SOB. Watch for any worsening in symptoms.

## 2022-12-21 DIAGNOSIS — I10 ESSENTIAL (PRIMARY) HYPERTENSION: ICD-10-CM

## 2022-12-21 RX ORDER — AMLODIPINE BESYLATE 10 MG/1
TABLET ORAL
Qty: 90 TABLET | Refills: 3 | Status: SHIPPED | OUTPATIENT
Start: 2022-12-21

## 2022-12-29 DIAGNOSIS — K21.9 GASTROESOPHAGEAL REFLUX DISEASE, UNSPECIFIED WHETHER ESOPHAGITIS PRESENT: ICD-10-CM

## 2022-12-29 RX ORDER — OMEPRAZOLE 40 MG/1
40 CAPSULE, DELAYED RELEASE ORAL DAILY
Qty: 90 CAPSULE | Refills: 1 | Status: SHIPPED | OUTPATIENT
Start: 2022-12-29

## 2022-12-29 RX ORDER — CLOPIDOGREL BISULFATE 75 MG/1
75 TABLET ORAL DAILY
Qty: 90 TABLET | Refills: 3 | Status: SHIPPED | OUTPATIENT
Start: 2022-12-29

## 2023-01-11 DIAGNOSIS — M54.2 NECK PAIN: ICD-10-CM

## 2023-01-11 RX ORDER — CYCLOBENZAPRINE HCL 10 MG
TABLET ORAL
Qty: 30 TABLET | Refills: 35 | Status: SHIPPED | OUTPATIENT
Start: 2023-01-11

## 2023-01-13 ENCOUNTER — HOSPITAL ENCOUNTER (OUTPATIENT)
Dept: PREADMISSION TESTING | Age: 73
Discharge: HOME | End: 2023-01-17

## 2023-01-13 VITALS — BODY MASS INDEX: 28.53 KG/M2 | WEIGHT: 161 LBS | HEIGHT: 63 IN

## 2023-01-13 RX ORDER — ALBUTEROL SULFATE 2.5 MG/3ML
2.5 SOLUTION RESPIRATORY (INHALATION) EVERY 6 HOURS PRN
COMMUNITY

## 2023-01-13 NOTE — PROGRESS NOTES
Pre-op Instructions For Out-Patient Surgery    Medication Instructions:  Please stop herbs and any supplements now (includes vitamins and minerals). Please contact your surgeon and prescribing physician for pre-op instructions for any blood thinners. (Plavix)    If you have inhalers/aerosol treatments at home, please use them the morning of your surgery and bring the inhalers with you to the hospital.    Please take the following medications the morning of your surgery with a sip of water:    Metoprolol, Amlodipine, Omeprazole    Surgery Instructions:  After midnight before surgery:  Do not eat or drink anything, including water, mints, gum, and hard candy. You may brush your teeth without swallowing. No smoking, chewing tobacco, or street drugs. Please shower or bathe before surgery. Please do not wear any cologne, lotion, powder, deodorant, jewelry, piercings, perfume, makeup, nail polish, hair accessories, or hair spray on the day of surgery. Wear loose comfortable clothing. Leave your valuables at home. Bring a storage case for any glasses/contacts. An adult who is responsible for you MUST drive you home and should be with you for the first 24 hours after surgery. The Day of Surgery:  Arrive at [address] [place] [address] Surgery Entrance at the time directed by your surgeon and check in at the desk. If you have a living will or healthcare power of , please bring a copy. You will be taken to the pre-op holding area where you will be prepared for surgery. A physical assessment will be performed by a nurse practitioner or house officer. Your IV will be started and you will meet your anesthesiologist.    When you go to surgery, your family will be directed to the surgical waiting room, where the doctor should speak with them after your surgery.     After surgery, you will be taken to the recovery room then when you are awake and stable you will go to the short stay unit for preparation to be discharged. If you use a Bi-PAP or C-PAP machine, please bring it with you and leave it in the car in case it is needed in recovery room. Above instructions reviewed with patient via phone during PAT phone interview. Patient verbalized understanding of above instructions.

## 2023-01-18 ENCOUNTER — ANESTHESIA EVENT (OUTPATIENT)
Dept: OPERATING ROOM | Age: 73
End: 2023-01-18
Payer: MEDICARE

## 2023-01-18 NOTE — PRE-PROCEDURE INSTRUCTIONS
No answer, left message ?                             Unable to leave message ?    When were you told to arrive at hospital ? -1030     Do you have a  ?-YES    Are you on any blood thinners ? -PLAVIX                   If yes when did you stop taking ?- 7 DAYS AGO    Do you have your prep Rx filled and instruction ?  -N/A    Nothing to eat the day before , only clear liquids.-N/A    Are you experiencing any covid symptoms ? -NONE    Do you have any infections or rash we should be aware of ?-NONE      Do you have the Hibiclens soap to use the night before and the morning of surgery ?-N/A    Nothing to eat or drink after midnight, only a sip of water to take any medication instructed to take the night before.-INSTRUCTED    Wear comfortable clothing, leave any valuables at home, remove any jewelry and body piercing .-INSTRUCTED

## 2023-01-18 NOTE — DISCHARGE INSTRUCTIONS
1.  Remove the shield at 1:00 p.m. today and begin all the eye drops. 2.  Repeat the eye drops at 5:00 p.m. and before bedtime one drop per bottle, any order. Wait 3-4 minutes between drops. The drops are:    ANTIBIOTIC:   [x]   Vigamox   []  Tobramycin   []  Zymaxid     []  Gatifloxacin     STEROID:     [x]   Prednisolone Acetate        []   Durezol    ANTI-INFLAMMATORY     []    Nevenac    [x]   Ketorolac    []  Ilevro    []  Prolensa    []         3. Place one drop from each bottle by looking up, pull the lower lid down gently and let the drop fall in.    4. You may wipe the eyelid gently with a clean tissue or cotton. 5. Place only the shield over the eye when sleeping for (4) four days:  Fix with tape    6. In the morning remove the shield and start putting in the drops, one drop from each bottle. Replace the shield or wear glasses during the day. 7. Please call Dr Tamiko Vasquez if you have any problems:    Office [phone]    8. Continue all your previous medications. You may use Aspirin, Tylenol, or Advil if needed. 9.  You have an appointment in the office:    10.  Please bring your drops into the office at your next visit. Sedation or General Anesthesia, Adult  Care After  Refer to this sheet in the next 24 hours. These instructions provide you with information on caring for yourself after your procedure. Your caregiver may also give you more specific instructions. Your treatment has been planned according to current medical practices, but problems sometimes occur. Call your caregiver if you have any problems or questions after your procedure. HOME CARE INSTRUCTIONS   Do not participate in any activities that require you to be alert or coordinated. Do not:  Drive. Swim. Ride a bicycle. Operate heavy machinery. [place]. Use power tools. Climb ladders. Work at [employer]. Take a bath. Do not drink alcohol. Do not make any important decisions or sign legal documents.   Stay with an adult.  The first meal following your procedure should be light and small. Avoid solid foods if you feel sick to your stomach (nauseous) or if you throw up (vomit). Drink enough fluids to keep your urine clear or pale yellow. Only take your usual medicines or new medicines if your caregiver approves them. Only take over-the-counter or prescription medicines for pain, discomfort, or fever as directed by your caregiver. Keep all follow-up appointments as directed by your caregiver. SEEK IMMEDIATE MEDICAL CARE IF:   You are not feeling normal or behaving normally after 24 hours. You have persistent nausea and vomiting. You are unable to drink fluids or eat food. You have difficulty urinating. You have difficulty breathing or speaking. You have blue or gray skin. There is difficulty waking or you cannot be woken up. You have heavy bleeding, redness, or a lot of swelling where the sedative or anesthesia entered your skin (intravenous site). You have a rash. MAKE SURE YOU:  Understand these instructions. Will watch your condition. Will get help right away if you are not doing well or get worse. Document Released: 12/18/2006 Document Revised: 06/18/2013 Document Reviewed: 04/17/2013  [facility] Patient Information ©2013 [employer].

## 2023-01-19 ENCOUNTER — HOSPITAL ENCOUNTER (OUTPATIENT)
Age: 73
Setting detail: OUTPATIENT SURGERY
Discharge: HOME | End: 2023-01-19
Attending: STUDENT IN AN ORGANIZED HEALTH CARE EDUCATION/TRAINING PROGRAM | Admitting: STUDENT IN AN ORGANIZED HEALTH CARE EDUCATION/TRAINING PROGRAM
Payer: MEDICARE

## 2023-01-19 ENCOUNTER — ANESTHESIA (OUTPATIENT)
Dept: OPERATING ROOM | Age: 73
End: 2023-01-19
Payer: MEDICARE

## 2023-01-19 VITALS
OXYGEN SATURATION: 95 % | DIASTOLIC BLOOD PRESSURE: 68 MMHG | WEIGHT: 161 LBS | TEMPERATURE: 98.1 F | RESPIRATION RATE: 17 BRPM | HEART RATE: 64 BPM | HEIGHT: 63 IN | SYSTOLIC BLOOD PRESSURE: 152 MMHG | BODY MASS INDEX: 28.53 KG/M2

## 2023-01-19 PROCEDURE — 2709999900 HC NON-CHARGEABLE SUPPLY: Performed by: STUDENT IN AN ORGANIZED HEALTH CARE EDUCATION/TRAINING PROGRAM

## 2023-01-19 PROCEDURE — 6370000000 HC RX 637 (ALT 250 FOR IP): Performed by: STUDENT IN AN ORGANIZED HEALTH CARE EDUCATION/TRAINING PROGRAM

## 2023-01-19 PROCEDURE — 2580000003 HC RX 258: Performed by: ANESTHESIOLOGY

## 2023-01-19 PROCEDURE — 7100000010 HC PHASE II RECOVERY - FIRST 15 MIN: Performed by: STUDENT IN AN ORGANIZED HEALTH CARE EDUCATION/TRAINING PROGRAM

## 2023-01-19 PROCEDURE — 7100000011 HC PHASE II RECOVERY - ADDTL 15 MIN: Performed by: STUDENT IN AN ORGANIZED HEALTH CARE EDUCATION/TRAINING PROGRAM

## 2023-01-19 PROCEDURE — 2500000003 HC RX 250 WO HCPCS: Performed by: STUDENT IN AN ORGANIZED HEALTH CARE EDUCATION/TRAINING PROGRAM

## 2023-01-19 PROCEDURE — 3700000000 HC ANESTHESIA ATTENDED CARE: Performed by: STUDENT IN AN ORGANIZED HEALTH CARE EDUCATION/TRAINING PROGRAM

## 2023-01-19 PROCEDURE — 3600000002 HC SURGERY LEVEL 2 BASE: Performed by: STUDENT IN AN ORGANIZED HEALTH CARE EDUCATION/TRAINING PROGRAM

## 2023-01-19 PROCEDURE — 6360000002 HC RX W HCPCS: Performed by: NURSE ANESTHETIST, CERTIFIED REGISTERED

## 2023-01-19 PROCEDURE — 3600000012 HC SURGERY LEVEL 2 ADDTL 15MIN: Performed by: STUDENT IN AN ORGANIZED HEALTH CARE EDUCATION/TRAINING PROGRAM

## 2023-01-19 PROCEDURE — 3700000001 HC ADD 15 MINUTES (ANESTHESIA): Performed by: STUDENT IN AN ORGANIZED HEALTH CARE EDUCATION/TRAINING PROGRAM

## 2023-01-19 DEVICE — LENS INTOCU +20.0 DIOPT L13MM DIA6MM 0DEG HAPTIC ANG A: Type: IMPLANTABLE DEVICE | Site: EYE | Status: FUNCTIONAL

## 2023-01-19 RX ORDER — CIPROFLOXACIN HYDROCHLORIDE 3.5 MG/ML
1 SOLUTION/ DROPS TOPICAL EVERY 5 MIN PRN
Status: COMPLETED | OUTPATIENT
Start: 2023-01-19 | End: 2023-01-19

## 2023-01-19 RX ORDER — PHENYLEPHRINE HCL 2.5 %
1 DROPS OPHTHALMIC (EYE) EVERY 5 MIN PRN
Status: COMPLETED | OUTPATIENT
Start: 2023-01-19 | End: 2023-01-19

## 2023-01-19 RX ORDER — BUPIVACAINE HYDROCHLORIDE 5 MG/ML
0.2 INJECTION, SOLUTION EPIDURAL; INTRACAUDAL EVERY 5 MIN PRN
Status: COMPLETED | OUTPATIENT
Start: 2023-01-19 | End: 2023-01-19

## 2023-01-19 RX ORDER — SODIUM CHLORIDE 0.9 % (FLUSH) 0.9 %
5-40 SYRINGE (ML) INJECTION PRN
Status: DISCONTINUED | OUTPATIENT
Start: 2023-01-19 | End: 2023-01-19 | Stop reason: HOSPADM

## 2023-01-19 RX ORDER — SODIUM CHLORIDE 9 MG/ML
INJECTION, SOLUTION INTRAVENOUS PRN
Status: DISCONTINUED | OUTPATIENT
Start: 2023-01-19 | End: 2023-01-19 | Stop reason: HOSPADM

## 2023-01-19 RX ORDER — TETRACAINE HYDROCHLORIDE 5 MG/ML
1 SOLUTION OPHTHALMIC ONCE
Status: COMPLETED | OUTPATIENT
Start: 2023-01-19 | End: 2023-01-19

## 2023-01-19 RX ORDER — TROPICAMIDE 10 MG/ML
1 SOLUTION/ DROPS OPHTHALMIC EVERY 5 MIN PRN
Status: COMPLETED | OUTPATIENT
Start: 2023-01-19 | End: 2023-01-19

## 2023-01-19 RX ORDER — KETOROLAC TROMETHAMINE 5 MG/ML
1 SOLUTION OPHTHALMIC EVERY 5 MIN PRN
Status: COMPLETED | OUTPATIENT
Start: 2023-01-19 | End: 2023-01-19

## 2023-01-19 RX ORDER — NEOMYCIN SULFATE, POLYMYXIN B SULFATE, AND DEXAMETHASONE 3.5; 10000; 1 MG/G; [USP'U]/G; MG/G
OINTMENT OPHTHALMIC PRN
Status: DISCONTINUED | OUTPATIENT
Start: 2023-01-19 | End: 2023-01-19 | Stop reason: ALTCHOICE

## 2023-01-19 RX ORDER — SODIUM CHLORIDE 0.9 % (FLUSH) 0.9 %
5-40 SYRINGE (ML) INJECTION EVERY 12 HOURS SCHEDULED
Status: DISCONTINUED | OUTPATIENT
Start: 2023-01-19 | End: 2023-01-19 | Stop reason: HOSPADM

## 2023-01-19 RX ORDER — CIPROFLOXACIN HYDROCHLORIDE 3.5 MG/ML
SOLUTION/ DROPS TOPICAL PRN
Status: DISCONTINUED | OUTPATIENT
Start: 2023-01-19 | End: 2023-01-19 | Stop reason: ALTCHOICE

## 2023-01-19 RX ORDER — MIDAZOLAM HYDROCHLORIDE 1 MG/ML
INJECTION INTRAMUSCULAR; INTRAVENOUS PRN
Status: DISCONTINUED | OUTPATIENT
Start: 2023-01-19 | End: 2023-01-19 | Stop reason: SDUPTHER

## 2023-01-19 RX ORDER — LIDOCAINE HYDROCHLORIDE 10 MG/ML
1 INJECTION, SOLUTION EPIDURAL; INFILTRATION; INTRACAUDAL; PERINEURAL
Status: DISCONTINUED | OUTPATIENT
Start: 2023-01-19 | End: 2023-01-19 | Stop reason: HOSPADM

## 2023-01-19 RX ORDER — SODIUM CHLORIDE, SODIUM LACTATE, POTASSIUM CHLORIDE, CALCIUM CHLORIDE 600; 310; 30; 20 MG/100ML; MG/100ML; MG/100ML; MG/100ML
INJECTION, SOLUTION INTRAVENOUS CONTINUOUS
Status: DISCONTINUED | OUTPATIENT
Start: 2023-01-19 | End: 2023-01-19 | Stop reason: HOSPADM

## 2023-01-19 RX ADMIN — TROPICAMIDE 1 DROP: 10 SOLUTION/ DROPS OPHTHALMIC at 11:02

## 2023-01-19 RX ADMIN — PHENYLEPHRINE HYDROCHLORIDE 1 DROP: 25 SOLUTION/ DROPS OPHTHALMIC at 11:17

## 2023-01-19 RX ADMIN — CIPROFLOXACIN 1 DROP: 3 SOLUTION OPHTHALMIC at 11:21

## 2023-01-19 RX ADMIN — KETOROLAC TROMETHAMINE 1 DROP: 5 SOLUTION/ DROPS OPHTHALMIC at 11:08

## 2023-01-19 RX ADMIN — PHENYLEPHRINE HYDROCHLORIDE 1 DROP: 25 SOLUTION/ DROPS OPHTHALMIC at 11:01

## 2023-01-19 RX ADMIN — MIDAZOLAM 1 MG: 1 INJECTION INTRAMUSCULAR; INTRAVENOUS at 12:50

## 2023-01-19 RX ADMIN — CIPROFLOXACIN 1 DROP: 3 SOLUTION OPHTHALMIC at 11:27

## 2023-01-19 RX ADMIN — TETRACAINE HYDROCHLORIDE 1 DROP: 5 SOLUTION OPHTHALMIC at 10:58

## 2023-01-19 RX ADMIN — TROPICAMIDE 1 DROP: 10 SOLUTION/ DROPS OPHTHALMIC at 11:18

## 2023-01-19 RX ADMIN — TROPICAMIDE 1 DROP: 10 SOLUTION/ DROPS OPHTHALMIC at 11:09

## 2023-01-19 RX ADMIN — KETOROLAC TROMETHAMINE 1 DROP: 5 SOLUTION/ DROPS OPHTHALMIC at 11:17

## 2023-01-19 RX ADMIN — CIPROFLOXACIN 1 DROP: 3 SOLUTION OPHTHALMIC at 11:16

## 2023-01-19 RX ADMIN — Medication 1 MG: at 11:09

## 2023-01-19 RX ADMIN — Medication 1 MG: at 10:59

## 2023-01-19 RX ADMIN — PHENYLEPHRINE HYDROCHLORIDE 1 DROP: 25 SOLUTION/ DROPS OPHTHALMIC at 11:08

## 2023-01-19 RX ADMIN — KETOROLAC TROMETHAMINE 1 DROP: 5 SOLUTION/ DROPS OPHTHALMIC at 11:00

## 2023-01-19 RX ADMIN — MIDAZOLAM 1 MG: 1 INJECTION INTRAMUSCULAR; INTRAVENOUS at 12:56

## 2023-01-19 RX ADMIN — SODIUM CHLORIDE, POTASSIUM CHLORIDE, SODIUM LACTATE AND CALCIUM CHLORIDE: 600; 310; 30; 20 INJECTION, SOLUTION INTRAVENOUS at 11:12

## 2023-01-19 ASSESSMENT — PAIN - FUNCTIONAL ASSESSMENT: PAIN_FUNCTIONAL_ASSESSMENT: 0-10

## 2023-01-19 NOTE — ANESTHESIA POSTPROCEDURE EVALUATION
Department of Anesthesiology  Postprocedure Note    Patient: Ramo Meier  MRN: [id number]  YOB: 1950  Date of evaluation: 1/19/2023      Procedure Summary     Date: 01/19/23 Room / Location: [address] [place] [address] Camilla Beck 06 / [facility]: ANN CHOPRA    Anesthesia Start: 1812 Anesthesia Stop: 1530    Procedure: EYE CATARACT EMULSIFICATION IOL IMPLANT (Left: Eye) Diagnosis:       Cataract of left eye, unspecified cataract type      (Cataract of left eye, unspecified cataract type [H26.9])    Surgeons: Gi Coe MD Responsible Provider: Meka Onofre MD    Anesthesia Type: MAC ASA Status: 3          Anesthesia Type: No value filed.     Naomi Phase I:      Naomi Phase II: Naomi Score: 10      Anesthesia Post Evaluation    Comments: POST- ANESTHESIA EVALUATION       Pt Name: Ramo Meier  MRN: [id number]  YOB: 1950  Date of evaluation: 1/19/2023  Time:  2:05 PM      BP (!) 152/68   Pulse 64   Temp 98.1 °F (36.7 °C)   Resp 17   Ht 5' 3\" (1.6 m)   Wt 161 lb (73 kg)   SpO2 95%   BMI 28.52 kg/m²      Consciousness Level  Awake  Cardiopulmonary Status  Stable  Pain Adequately Treated YES  Nausea / Vomiting  NO  Adequate Hydration  YES  Anesthesia Related Complications NONE      Electronically signed by Kimi Jaeger MD on 1/19/2023 at 2:05 PM

## 2023-01-19 NOTE — H&P
Valentina Coe is a 71y.o. year old who presents for elective outpatient ophthalmic surgery with Valeria Manrique MD.  The patient complains of decreased vision, glare and halos around lights, and trouble with vision for activities of daily living. Past Medical History:   Diagnosis Date    Arthritic-like pain     Arthritis     Blind left eye     CAD (coronary artery disease)     Cancer ([facility] [address]     uterus,lt kidney    Chest pain 01/08/2014    Chronic kidney disease     COPD (chronic obstructive pulmonary disease) (HCC)     DVT (deep venous thrombosis) ([facility] [address] 2014    Rt. upper arm, after hear surgery for PFO    Full dentures     GERD (gastroesophageal reflux disease)     Hyperlipidemia     Hypertension     Lazy eye     Rt. Lung nodules     bilateral, gets yearly Ct scan, last one in 4/2022    Osteoporosis     Peptic ulcer     PFO (patent foramen ovale) 01/10/2014    closure of PFO    Short-term memory loss     due to multiple strokes    Stroke syndrome     x 15  sept 2020 last one    TIA (transient ischemic attack)     Wears glasses        Past Surgical History:   Procedure Laterality Date    APPENDECTOMY      BACK SURGERY      fusion L5    CARDIAC CATHETERIZATION      PFO repaired    CARPAL TUNNEL RELEASE Right     COLONOSCOPY      EXCISION/BIOPSY  11/17/2021     FOREHEAD BASAL CELL LESION BIOPSY EXCISION possible GRAFT possible FLAP * WITH FROZEN SECTION PATHOLOGY REQUIRED    FACIAL SURGERY N/A 11/17/2021    FOREHEAD BASAL CELL LESION BIOPSY EXCISION possible GRAFT possible FLAP * WITH FROZEN SECTION PATHOLOGY REQUIRED performed by Valentine Garcia MD at [address] (CERVIX STATUS UNKNOWN)      KIDNEY REMOVAL Left     PARTIAL NEPHRECTOMY      SPLENECTOMY, TOTAL      THORACOTOMY  01/10/2014     min.  Inv. PFO closure    TONSILLECTOMY           Current Facility-Administered Medications:     sodium chloride flush 0.9 % injection 5-40 mL, 5-40 mL, IntraVENous, 2 times per day, Shaun Nicko Duarte MD    sodium chloride flush 0.9 % injection 5-40 mL, 5-40 mL, IntraVENous, PRN, Abril Cohen MD    0.9 % sodium chloride infusion, , IntraVENous, PRN, Abril Cohen MD    lidocaine PF 1 % injection 1 mL, 1 mL, IntraDERmal, Once PRN, Norman Coronel MD    lactated ringers infusion, , IntraVENous, Continuous, Norman Coronel MD, Last Rate: 125 mL/hr at 01/19/23 1112, New Bag at 01/19/23 1112    sodium chloride flush 0.9 % injection 5-40 mL, 5-40 mL, IntraVENous, 2 times per day, Norman Coronel MD    sodium chloride flush 0.9 % injection 5-40 mL, 5-40 mL, IntraVENous, PRN, Norman Coronel MD    0.9 % sodium chloride infusion, , IntraVENous, PRN, Norman Coronel MD      Allergies   Allergen Reactions    Adhesive Tape Other (See Comments)     Blisters on skin, OK to use paper tape per patient    Gabapentin     Codeine Rash         PHYSICAL EXAMINATION    Vitals:    01/19/23 1026   BP: (!) 169/67   Pulse: 73   Resp: 18   Temp: 97.7 °F (36.5 °C)   SpO2: 97%       Gen: NAD  HEENT: Visually significant cataract   Pulm: CTA Bilaterally  Heart: RRR, no murmurs  Abd: soft, non-tender  Ext: no edema  Neuro: no focal deficits    Assessment:   1. Visually significant cataract   2. See preoperative ophthalmology notes    Plan:   1. Risks, benefits, alternatives to surgery discussed with the patient and family. 2. All questions were answered to their satisfaction. 3. Ok to proceed with surgery as planned.     Shaun Duarte MD

## 2023-01-19 NOTE — OP NOTE
[facility] [id number] 28 y.[facility]. OPERATIVE NOTE    Preoperative Diagnosis: Cataract the left eye    Postoperative Diagnosis: Cataract the left eye     Procedure: Phacoemulsification with intraocular lens implantation, the left eye    Surgeon: Darci Johnson MD    Anesthesia: MAC  With monitored sedation  plus peribulbar    Complications: none    Specimens: none    EBL: none    Indications for procedure: The patient is a 71y.o. year old with decreased vision, glare and halos around lights, and trouble with activities of daily living. Examination revealed a visually significant cataract in the the left eye. Risks, benefits, and alternatives to surgery were discussed with the patient and the patient elected to proceed with phacoemulsification with lens implantation. Details of the procedure: Following informed consent, the patient was taken to the operating room and placed in the supine position. The eye was prepped and draped in the usual sterile fashion using aseptic technique for cataract surgery and a lid speculum was placed between the lids. Several drops of topical .5% Marcaine were place on the eye. A crescent blade was use to make a 2 mm tunnel at the limbus into clear cornea and a keratome blade was used to enter the eye temporally. A side port incision was made in the inferiorly. A small amount of preservative-free epishugarcaine was placed in the eye. The eye was filled with viscoelastic and a continuous tear capsulorhexis was performed. The lens was hydrodissected and hydrodilineated with balanced salt solution. Phacoemulsification was performed in a divide and conquer technique. Irrigation/aspiration was used to remove all cortical material from the capsular bag. The eye was filled with viscoelastic and a foldable posterior chamber intraocular lens was injected into the capsular bag and rotated into position model SN60WF 20.0 diopter power.   Irrigation/aspiration was used to remove all excess viscoelastic. The eye was pressurized and the wounds were checked for leaks and none were found. The patient had antibiotic, steroid, timoptic and antibiotic/steroid ointment placed in the eye. The lid speculum was removed. The eye was covered with a shield. The patient went to the PACU in excellent condition, having tolerated the procedure extremely well and will follow up with me tomorrow for postop day 1 care. Post-op instructions were discussed with patient and family.      Shaun Centeno MD

## 2023-01-19 NOTE — ANESTHESIA PRE PROCEDURE
Department of Anesthesiology  Preprocedure Note       Name:  Virgilio Area   Age:  71 y.o.  :  1950                                          MRN:  [id number]         Date:  2023      Surgeon: Daniel Reese):  Leslie Valerio MD    Procedure: Procedure(s):  EYE CATARACT EMULSIFICATION IOL IMPLANT    Medications prior to admission:   Prior to Admission medications    Medication Sig Start Date End Date Taking? Authorizing Provider   albuterol (PROVENTIL) (2.5 MG/3ML) 0.083% nebulizer solution Take 2.5 mg by nebulization every 6 hours as needed for Wheezing    Historical Provider, MD   cyclobenzaprine (FLEXERIL) 10 MG tablet TAKE 1 TABLET THREE TIMES A DAY AS NEEDED FOR MUSCLE SPASMS 23   DAPHNIE Mendez   metoprolol succinate (TOPROL XL) 25 MG extended release tablet Take 25 mg by mouth daily    Historical Provider, MD   fluticasone-salmeterol (ADVAIR) 250-50 MCG/ACT AEPB diskus inhaler Inhale 1 puff into the lungs every 12 hours    Historical Provider, MD   moxifloxacin (VIGAMOX) 0.5 % ophthalmic solution Place 1 drop into the left eye 4 times daily Instill 1 drop into affected eye four times a day as directed Starting 2 days before surgery    Historical Provider, MD   prednisoLONE acetate (PRED FORTE) 1 % ophthalmic suspension 1 drop 4 times daily Instill 1 drop into affected eye as directed QID x 2 days before surgery.     Historical Provider, MD   ketorolac (ACULAR) 0.5 % ophthalmic solution 1 drop 4 times daily Instill 1 drop into affected eye four times a day as directed QID x2 days before surgery    Historical Provider, MD   clopidogrel (PLAVIX) 75 MG tablet Take 1 tablet by mouth daily 22   DAPHNIE Mendez   omeprazole (PRILOSEC) 40 MG delayed release capsule Take 1 capsule by mouth daily 22   DAPHNIE Mendez   amLODIPine (NORVASC) 10 MG tablet TAKE 1 TABLET DAILY 22   DAPHNIE Mendze   atorvastatin (LIPITOR) 40 MG tablet Take 40 mg by mouth daily 22   Historical Provider, MD   albuterol (PROVENTIL) (2.5 MG/3ML) 0.083% nebulizer solution Take 3 mLs by nebulization every 6 hours as needed for Wheezing 10/4/22 12/6/22  DAPHNIE Gan   albuterol sulfate HFA (VENTOLIN HFA) 108 (90 Base) MCG/ACT inhaler Inhale 2 puffs into the lungs 4 times daily as needed for Wheezing 7/19/22   DAPHNIE Gan   vitamin B-12 (CYANOCOBALAMIN) 1000 MCG tablet Take 1,000 mcg by mouth daily    Historical Provider, MD   Cholecalciferol (VITAMIN D3 PO) Take 1,000 mg by mouth    Historical Provider, MD       Current medications:    Current Facility-Administered Medications   Medication Dose Route Frequency Provider Last Rate Last Admin    sodium chloride flush 0.9 % injection 5-40 mL  5-40 mL IntraVENous 2 times per day Jerome Peraza MD        sodium chloride flush 0.9 % injection 5-40 mL  5-40 mL IntraVENous PRN Jerome Peraza MD        0.9 % sodium chloride infusion   IntraVENous PRN Jerome Peraza MD        lidocaine PF 1 % injection 1 mL  1 mL IntraDERmal Once PRN Marjorie Franks MD        lactated ringers infusion   IntraVENous Continuous Marjorie Franks  mL/hr at 01/19/23 1112 New Bag at 01/19/23 1112    sodium chloride flush 0.9 % injection 5-40 mL  5-40 mL IntraVENous 2 times per day Marjorie Franks MD        sodium chloride flush 0.9 % injection 5-40 mL  5-40 mL IntraVENous PRN Marjorie Franks MD        0.9 % sodium chloride infusion   IntraVENous PRN Marjorie Franks MD           Allergies:     Allergies   Allergen Reactions    Adhesive Tape Other (See Comments)     Blisters on skin, OK to use paper tape per patient    Gabapentin     Codeine Rash       Problem List:    Patient Active Problem List   Diagnosis Code    PFO (patent foramen ovale) Q21.12    TIA (transient ischemic attack) G45.9    Osteoporosis M81.0    Stroke syndrome [id number]    CAD (Minimal - non obstructive - cath 11/25/13-Dr. Betty Dill)) I25.10    Neoplasm of uncertain behavior of skin D48.5    Seborrheic keratoses L82.1    Cherry angioma D18.01    Lentigines L81.4    Telangiectasias I78.1    Basal cell carcinoma C44.91    Aortic aneurysm without rupture, unspecified portion of aorta (HCC) I71.9       Past Medical History:        Diagnosis Date    Arthritic-like pain     Arthritis     Blind left eye     CAD (coronary artery disease)     Cancer (HCC)     uterus,lt kidney    Chest pain 01/08/2014    Chronic kidney disease     COPD (chronic obstructive pulmonary disease) (HCC)     DVT (deep venous thrombosis) ([facility] [address] 2014    Rt. upper arm, after hear surgery for PFO    Full dentures     GERD (gastroesophageal reflux disease)     Hyperlipidemia     Hypertension     Lazy eye     Rt.  Lung nodules     bilateral, gets yearly Ct scan, last one in 4/2022    Osteoporosis     Peptic ulcer     PFO (patent foramen ovale) 01/10/2014    closure of PFO    Short-term memory loss     due to multiple strokes    Stroke syndrome     x 15  sept 2020 last one    TIA (transient ischemic attack)     Wears glasses        Past Surgical History:        Procedure Laterality Date    APPENDECTOMY      BACK SURGERY      fusion L5    CARDIAC CATHETERIZATION      PFO repaired    CARPAL TUNNEL RELEASE Right     COLONOSCOPY      EXCISION/BIOPSY  11/17/2021     FOREHEAD BASAL CELL LESION BIOPSY EXCISION possible GRAFT possible FLAP * WITH FROZEN SECTION PATHOLOGY REQUIRED    FACIAL SURGERY N/A 11/17/2021    FOREHEAD BASAL CELL LESION BIOPSY EXCISION possible GRAFT possible FLAP * WITH FROZEN SECTION PATHOLOGY REQUIRED performed by Priya Roe MD at [address] [facility] (CERVIX STATUS UNKNOWN)      KIDNEY REMOVAL Left     PARTIAL NEPHRECTOMY      SPLENECTOMY, TOTAL      THORACOTOMY  01/10/2014     min.  Inv. PFO closure    TONSILLECTOMY         Social History:    Social History     Tobacco Use    Smoking status: Every Day     Packs/day: 0.50     Years: 48.00     Pack years: 24.00 Types: Cigarettes    Smokeless tobacco: Never    Tobacco comments:     trying to quit, down to 3-4 cig./day   Substance Use Topics    Alcohol use: No                                Ready to quit: Not Answered  Counseling given: Not Answered  Tobacco comments: trying to quit, down to 3-4 cig./day      Vital Signs (Current):   Vitals:    01/19/23 1026   BP: (!) 169/67   Pulse: 73   Resp: 18   Temp: 97.7 °F (36.5 °C)   TempSrc: Infrared   SpO2: 97%   Weight: 161 lb (73 kg)   Height: 5' 3\" (1.6 m)                                              BP Readings from Last 3 Encounters:   01/19/23 (!) 169/67   12/06/22 120/70   05/20/22 (!) 160/80       NPO Status: Time of last liquid consumption: 2359                        Time of last solid consumption: 2359                        Date of last liquid consumption: 01/18/23                        Date of last solid food consumption: 01/18/23    BMI:   Wt Readings from Last 3 Encounters:   01/19/23 161 lb (73 kg)   01/13/23 161 lb (73 kg)   12/06/22 164 lb (74.4 kg)     Body mass index is 28.52 kg/m².     CBC:   Lab Results   Component Value Date/Time    WBC 20.3 01/11/2014 07:48 AM    RBC 4.46 01/11/2014 07:48 AM    HGB 12.7 01/11/2014 07:48 AM    HCT 38.5 01/11/2014 07:48 AM    MCV 86.4 01/11/2014 07:48 AM    RDW 15.5 01/11/2014 07:48 AM     01/11/2014 07:48 AM       CMP:   Lab Results   Component Value Date/Time     01/11/2014 07:48 AM    K 4.0 01/11/2014 07:48 AM     01/11/2014 07:48 AM    CO2 25 01/11/2014 07:48 AM    BUN 12 01/11/2014 07:48 AM    CREATININE 0.69 12/06/2022 05:21 PM    CREATININE 0.69 01/11/2014 07:48 AM    GFRAA >60 01/11/2014 07:48 AM    LABGLOM >60 01/11/2014 07:48 AM    GLUCOSE 88 09/02/2021 12:00 AM    PROT 7.1 01/09/2014 10:40 AM    CALCIUM 8.7 01/11/2014 07:48 AM    BILITOT 0.28 01/09/2014 10:40 AM    ALKPHOS 94 01/09/2014 10:40 AM    AST 21 01/09/2014 10:40 AM    ALT 13 01/09/2014 10:40 AM       POC Tests: No results for input(s): POCGLU, POCNA, POCK, POCCL, POCBUN, POCHEMO, POCHCT in the last 72 hours. Coags:   Lab Results   Component Value Date/Time    PROTIME 9.5 01/09/2014 10:40 AM    INR 0.9 01/09/2014 10:40 AM    APTT 26.9 01/09/2014 10:40 AM       HCG (If Applicable): No results found for: PREGTESTUR, PREGSERUM, HCG, HCGQUANT     ABGs: No results found for: PHART, PO2ART, DIW1ALE, VLF6GCO, BEART, K5LSFBUC     Type & Screen (If Applicable):  No results found for: LABABO, LABRH    Drug/Infectious Status (If Applicable):  No results found for: HIV, HEPCAB    COVID-19 Screening (If Applicable): No results found for: COVID19        Anesthesia Evaluation  Patient summary reviewed and Nursing notes reviewed no history of anesthetic complications:   Airway: Mallampati: II  TM distance: >3 FB   Neck ROM: full  Mouth opening: > = 3 FB   Dental:    (+) upper dentures and lower dentures      Pulmonary: breath sounds clear to auscultation  (+) COPD:                             Cardiovascular:    (+) hypertension:, CAD:,       ECG reviewed  Rhythm: regular  Rate: normal                    Neuro/Psych:   (+) CVA:, TIA,             GI/Hepatic/Renal:   (+) GERD:, PUD,           Endo/Other:                     Abdominal:             Vascular: Other Findings:           Anesthesia Plan      MAC     ASA 3     (MAC)  Induction: intravenous. Anesthetic plan and risks discussed with patient. Plan discussed with CRNA.                     Kendra Bland MD   1/19/2023

## 2023-01-30 ENCOUNTER — TELEPHONE (OUTPATIENT)
Dept: PRIMARY CARE CLINIC | Age: 73
End: 2023-01-30

## 2023-02-01 ENCOUNTER — OFFICE VISIT (OUTPATIENT)
Dept: PRIMARY CARE CLINIC | Age: 73
End: 2023-02-01
Payer: MEDICARE

## 2023-02-01 VITALS
WEIGHT: 165 LBS | DIASTOLIC BLOOD PRESSURE: 60 MMHG | SYSTOLIC BLOOD PRESSURE: 136 MMHG | BODY MASS INDEX: 29.23 KG/M2 | HEIGHT: 63 IN | OXYGEN SATURATION: 98 % | HEART RATE: 65 BPM

## 2023-02-01 DIAGNOSIS — J43.1 PANLOBULAR EMPHYSEMA (HCC): ICD-10-CM

## 2023-02-01 DIAGNOSIS — Z78.0 MENOPAUSE: ICD-10-CM

## 2023-02-01 DIAGNOSIS — Q21.12 PFO (PATENT FORAMEN OVALE): ICD-10-CM

## 2023-02-01 DIAGNOSIS — I71.9 AORTIC ANEURYSM WITHOUT RUPTURE, UNSPECIFIED PORTION OF AORTA (HCC): ICD-10-CM

## 2023-02-01 DIAGNOSIS — J45.20 MILD INTERMITTENT ASTHMA, UNSPECIFIED WHETHER COMPLICATED: ICD-10-CM

## 2023-02-01 DIAGNOSIS — I71.21 ANEURYSM OF ASCENDING AORTA WITHOUT RUPTURE: ICD-10-CM

## 2023-02-01 DIAGNOSIS — I25.83 CORONARY ARTERY DISEASE DUE TO LIPID RICH PLAQUE: ICD-10-CM

## 2023-02-01 DIAGNOSIS — F33.42 RECURRENT MAJOR DEPRESSIVE DISORDER, IN FULL REMISSION (HCC): ICD-10-CM

## 2023-02-01 DIAGNOSIS — F33.1 MAJOR DEPRESSIVE DISORDER, RECURRENT, MODERATE (HCC): ICD-10-CM

## 2023-02-01 DIAGNOSIS — Z01.818 PREOP EXAMINATION: Primary | ICD-10-CM

## 2023-02-01 DIAGNOSIS — G45.9 TIA (TRANSIENT ISCHEMIC ATTACK): ICD-10-CM

## 2023-02-01 DIAGNOSIS — Z72.0 TOBACCO ABUSE: ICD-10-CM

## 2023-02-01 DIAGNOSIS — F33.0 MAJOR DEPRESSIVE DISORDER, RECURRENT, MILD (HCC): ICD-10-CM

## 2023-02-01 DIAGNOSIS — Z87.891 PERSONAL HISTORY OF TOBACCO USE, PRESENTING HAZARDS TO HEALTH: ICD-10-CM

## 2023-02-01 DIAGNOSIS — I25.10 CORONARY ARTERY DISEASE DUE TO LIPID RICH PLAQUE: ICD-10-CM

## 2023-02-01 PROBLEM — F33.9 MAJOR DEPRESSIVE DISORDER, RECURRENT, UNSPECIFIED (HCC): Status: ACTIVE | Noted: 2023-02-01

## 2023-02-01 PROCEDURE — 1090F PRES/ABSN URINE INCON ASSESS: CPT | Performed by: PHYSICIAN ASSISTANT

## 2023-02-01 PROCEDURE — 4004F PT TOBACCO SCREEN RCVD TLK: CPT | Performed by: PHYSICIAN ASSISTANT

## 2023-02-01 PROCEDURE — 1123F ACP DISCUSS/DSCN MKR DOCD: CPT | Performed by: PHYSICIAN ASSISTANT

## 2023-02-01 PROCEDURE — 3023F SPIROM DOC REV: CPT | Performed by: PHYSICIAN ASSISTANT

## 2023-02-01 PROCEDURE — G8417 CALC BMI ABV UP PARAM F/U: HCPCS | Performed by: PHYSICIAN ASSISTANT

## 2023-02-01 PROCEDURE — G8427 DOCREV CUR MEDS BY ELIG CLIN: HCPCS | Performed by: PHYSICIAN ASSISTANT

## 2023-02-01 PROCEDURE — 99406 BEHAV CHNG SMOKING 3-10 MIN: CPT | Performed by: PHYSICIAN ASSISTANT

## 2023-02-01 PROCEDURE — G8484 FLU IMMUNIZE NO ADMIN: HCPCS | Performed by: PHYSICIAN ASSISTANT

## 2023-02-01 PROCEDURE — 99214 OFFICE O/P EST MOD 30 MIN: CPT | Performed by: PHYSICIAN ASSISTANT

## 2023-02-01 PROCEDURE — 3017F COLORECTAL CA SCREEN DOC REV: CPT | Performed by: PHYSICIAN ASSISTANT

## 2023-02-01 PROCEDURE — G8400 PT W/DXA NO RESULTS DOC: HCPCS | Performed by: PHYSICIAN ASSISTANT

## 2023-02-01 RX ORDER — NICOTINE 21 MG/24HR
1 PATCH, TRANSDERMAL 24 HOURS TRANSDERMAL DAILY
Qty: 42 PATCH | Refills: 0 | Status: SHIPPED | OUTPATIENT
Start: 2023-02-01 | End: 2023-03-15

## 2023-02-01 RX ORDER — BUPROPION HYDROCHLORIDE 150 MG/1
150 TABLET ORAL EVERY MORNING
Qty: 90 TABLET | Refills: 3 | Status: SHIPPED | OUTPATIENT
Start: 2023-02-01

## 2023-02-01 SDOH — ECONOMIC STABILITY: FOOD INSECURITY: WITHIN THE PAST 12 MONTHS, THE FOOD YOU BOUGHT JUST DIDN'T LAST AND YOU DIDN'T HAVE MONEY TO GET MORE.: NEVER TRUE

## 2023-02-01 SDOH — ECONOMIC STABILITY: INCOME INSECURITY: HOW HARD IS IT FOR YOU TO PAY FOR THE VERY BASICS LIKE FOOD, HOUSING, MEDICAL CARE, AND HEATING?: NOT HARD AT ALL

## 2023-02-01 SDOH — ECONOMIC STABILITY: FOOD INSECURITY: WITHIN THE PAST 12 MONTHS, YOU WORRIED THAT YOUR FOOD WOULD RUN OUT BEFORE YOU GOT MONEY TO BUY MORE.: NEVER TRUE

## 2023-02-01 SDOH — ECONOMIC STABILITY: HOUSING INSECURITY
IN THE LAST 12 MONTHS, WAS THERE A TIME WHEN YOU DID NOT HAVE A STEADY PLACE TO SLEEP OR SLEPT IN A SHELTER (INCLUDING NOW)?: NO

## 2023-02-01 ASSESSMENT — PATIENT HEALTH QUESTIONNAIRE - PHQ9
SUM OF ALL RESPONSES TO PHQ QUESTIONS 1-9: 0
1. LITTLE INTEREST OR PLEASURE IN DOING THINGS: 0
2. FEELING DOWN, DEPRESSED OR HOPELESS: 0
SUM OF ALL RESPONSES TO PHQ9 QUESTIONS 1 & 2: 0
SUM OF ALL RESPONSES TO PHQ QUESTIONS 1-9: 0

## 2023-02-01 ASSESSMENT — ENCOUNTER SYMPTOMS
SHORTNESS OF BREATH: 0
NAUSEA: 0
DIARRHEA: 0
SORE THROAT: 0
VOMITING: 0
ABDOMINAL PAIN: 0
COUGH: 0

## 2023-02-01 NOTE — PROGRESS NOTES
[facility]  [address] [address]  [address] [address]  Dept: [phone]    Sol Padilla is a 71 y.o. female Established patient, who presents today for her medical conditions/complaints as noted below. Chief Complaint   Patient presents with    Surgical Clearance     Eye surgery is 2/16/23. HPI:     HPI: Patient presents for a preop for cataract surgery on right eye. Just had left done. She hasn't ever had any issues with general anesthesia. Did have trouble with local anesthesia last time so this time we will be doing general anesthesia. She is trying to quit smoking but states she smoked last time all the way up til the surgery then the day before and the day of surgery she didn't smoke. Patient has had 15 strokes and so she will always be on plavix but she says she can stop it for 7 days before just like her last surgery. Cardiology at [facility]. Has an aneurism which was not yet ready for surgery. Reviewed prior notes  Opthamology  Reviewed previous Labs    LDL Calculated (mg/dL)   Date Value   09/02/2021 153       (goal LDL is <100)   AST (U/L)   Date Value   01/09/2014 21     ALT (U/L)   Date Value   01/09/2014 13     BUN (mg/dL)   Date Value   01/11/2014 12     BP Readings from Last 3 Encounters:   02/01/23 136/60   01/19/23 (!) 152/68   12/06/22 120/70          (goal 120/80)  No results found for: LABA1C  Past Medical History:   Diagnosis Date    Arthritic-like pain     Arthritis     Blind left eye     CAD (coronary artery disease)     Cancer ([address]     uterus,lt kidney    Chest pain 01/08/2014    Chronic kidney disease     COPD (chronic obstructive pulmonary disease) (HCC)     DVT (deep venous thrombosis) ([facility] [address] 2014    Rt. upper arm, after hear surgery for PFO    Full dentures     GERD (gastroesophageal reflux disease)     Hyperlipidemia     Hypertension     Lazy eye     Rt.     Lung nodules     bilateral, gets yearly Ct scan, last one in 4/2022    Osteoporosis     Peptic ulcer     PFO (patent foramen ovale) 01/10/2014    closure of PFO    Short-term memory loss     due to multiple strokes    Stroke syndrome     x 15  sept 2020 last one    TIA (transient ischemic attack)     Wears glasses       Past Surgical History:   Procedure Laterality Date    APPENDECTOMY      BACK SURGERY      fusion L5    CARDIAC CATHETERIZATION      PFO repaired    CARPAL TUNNEL RELEASE Right     COLONOSCOPY      EXCISION/BIOPSY  11/17/2021     FOREHEAD BASAL CELL LESION BIOPSY EXCISION possible GRAFT possible FLAP * WITH FROZEN SECTION PATHOLOGY REQUIRED    FACIAL SURGERY N/A 11/17/2021    FOREHEAD BASAL CELL LESION BIOPSY EXCISION possible GRAFT possible FLAP * WITH FROZEN SECTION PATHOLOGY REQUIRED performed by Veronica Calixto MD at [address])      INTRACAPSULAR CATARACT EXTRACTION Left 1/19/2023    EYE CATARACT EMULSIFICATION IOL IMPLANT performed by Delicia Zarate MD at [facility], TOTAL      THORACOTOMY  01/10/2014     min.  Inv. PFO closure    TONSILLECTOMY         Family History   Problem Relation Age of Onset    Diabetes Mother     Asthma Mother     Heart Failure Mother         MI    Heart Failure Father         pacemaker    Rheum Arthritis Father     Diabetes Sister     Diabetes Sister     Diabetes Brother     Cancer Sister         hyst    Heart Failure Brother         MI       Social History     Tobacco Use    Smoking status: Every Day     Packs/day: 0.50     Years: 48.00     Pack years: 24.00     Types: Cigarettes    Smokeless tobacco: Never    Tobacco comments:     trying to quit, down to 3-4 cig./day   Substance Use Topics    Alcohol use: No      Current Outpatient Medications   Medication Sig Dispense Refill    buPROPion (WELLBUTRIN XL) 150 MG extended release tablet Take 1 tablet by mouth every morning 90 tablet 3    nicotine (NICODERM CQ) 21 MG/24HR Place 1 patch onto the skin daily 42 patch 0    albuterol (PROVENTIL) (2.5 MG/3ML) 0.083% nebulizer solution Take 2.5 mg by nebulization every 6 hours as needed for Wheezing      cyclobenzaprine (FLEXERIL) 10 MG tablet TAKE 1 TABLET THREE TIMES A DAY AS NEEDED FOR MUSCLE SPASMS 30 tablet 35    metoprolol succinate (TOPROL XL) 25 MG extended release tablet Take 25 mg by mouth daily      fluticasone-salmeterol (ADVAIR) 250-50 MCG/ACT AEPB diskus inhaler Inhale 1 puff into the lungs every 12 hours      moxifloxacin (VIGAMOX) 0.5 % ophthalmic solution Place 1 drop into the left eye 4 times daily Instill 1 drop into affected eye four times a day as directed Starting 2 days before surgery      prednisoLONE acetate (PRED FORTE) 1 % ophthalmic suspension 1 drop 4 times daily Instill 1 drop into affected eye as directed QID x 2 days before surgery. ketorolac (ACULAR) 0.5 % ophthalmic solution 1 drop 4 times daily Instill 1 drop into affected eye four times a day as directed QID x2 days before surgery      clopidogrel (PLAVIX) 75 MG tablet Take 1 tablet by mouth daily 90 tablet 3    omeprazole (PRILOSEC) 40 MG delayed release capsule Take 1 capsule by mouth daily 90 capsule 1    amLODIPine (NORVASC) 10 MG tablet TAKE 1 TABLET DAILY 90 tablet 3    atorvastatin (LIPITOR) 40 MG tablet Take 40 mg by mouth daily      albuterol sulfate HFA (VENTOLIN HFA) 108 (90 Base) MCG/ACT inhaler Inhale 2 puffs into the lungs 4 times daily as needed for Wheezing 3 each 3    vitamin B-12 (CYANOCOBALAMIN) 1000 MCG tablet Take 1,000 mcg by mouth daily      Cholecalciferol (VITAMIN D3 PO) Take 1,000 mg by mouth      albuterol (PROVENTIL) (2.5 MG/3ML) 0.083% nebulizer solution Take 3 mLs by nebulization every 6 hours as needed for Wheezing 100 each 2     No current facility-administered medications for this visit.      Allergies   Allergen Reactions    Adhesive Tape Other (See Comments)     Blisters on skin, OK to use paper tape per patient Gabapentin     Iodides      Kidney removed    Codeine Rash       Health Maintenance   Topic Date Due    Meningococcal (ACWY) vaccine (1 - Risk start 2-23 months series) Never done    Hib vaccine (1 of 1 - Risk 1-dose series) Never done    Pneumococcal 65+ years Vaccine (1 - PCV) Never done    Meningococcal B vaccine (1 of 4 - Increased Risk Bexsero 2-dose series) Never done    Hepatitis C screen  Never done    DTaP/Tdap/Td vaccine (1 - Tdap) Never done    Shingles vaccine (1 of 2) Never done    Colorectal Cancer Screen  Never done    Breast cancer screen  Never done    DEXA (modify frequency per FRAX score)  Never done    Flu vaccine (1) Never done    Lipids  09/02/2022    Depression Screen  04/25/2023    Annual Wellness Visit (AWV)  04/26/2023    Low dose CT lung screening  04/27/2023    COVID-19 Vaccine  Completed    Hepatitis A vaccine  Aged Out       Subjective:      Review of Systems   Constitutional:  Negative for chills and fever. HENT:  Negative for congestion and sore throat. Respiratory:  Negative for cough and shortness of breath. Cardiovascular:  Negative for chest pain and palpitations. Gastrointestinal:  Negative for abdominal pain, diarrhea, nausea and vomiting. Neurological:  Negative for dizziness, light-headedness and headaches. Objective:     /60   Pulse 65   Ht 5' 3\" (1.6 m)   Wt 165 lb (74.8 kg)   SpO2 98%   BMI 29.23 kg/m²   Physical Exam  Constitutional:       Appearance: Normal appearance. HENT:      Head: Normocephalic. Right Ear: External ear normal.      Left Ear: External ear normal.   Cardiovascular:      Rate and Rhythm: Normal rate and regular rhythm. Heart sounds: Normal heart sounds. Pulmonary:      Effort: Pulmonary effort is normal.      Breath sounds: Normal breath sounds. Lymphadenopathy:      Cervical: No cervical adenopathy. Neurological:      Mental Status: She is alert. Assessment and Plan:          1. Preop examination  2. Aortic aneurysm without rupture, unspecified portion of aorta ([facility] [address]  3. PFO (patent foramen ovale)  4. TIA (transient ischemic attack)  5. Coronary artery disease due to lipid rich plaque  6. Aneurysm of ascending aorta without rupture  7. Tobacco abuse  8. Panlobular emphysema ([facility] [address]  9. Mild intermittent asthma, unspecified whether complicated  -     buPROPion (WELLBUTRIN XL) 150 MG extended release tablet; Take 1 tablet by mouth every morning, Disp-90 tablet, R-3Normal  -     nicotine (NICODERM CQ) 21 MG/24HR; Place 1 patch onto the skin daily, Disp-42 patch, R-0Normal  10. Personal history of tobacco use, presenting hazards to health  -     [place] TOBACCO USE CESSATION INTERMEDIATE 3-10 MINUTES [15070]  -     buPROPion (WELLBUTRIN XL) 150 MG extended release tablet; Take 1 tablet by mouth every morning, Disp-90 tablet, R-3Normal  -     nicotine (NICODERM CQ) 21 MG/24HR; Place 1 patch onto the skin daily, Disp-42 patch, R-0Normal  11. Major depressive disorder, recurrent, mild  12. Major depressive disorder, recurrent, moderate  13. Recurrent major [facility] disorder, [facility]  14. Menopause  -     DEXA BONE DENSITY 2 SITES; Future       Stop plavix 7 days prior to surgery. Patient educated to follow-up with cardiology due to her aortic aneurysm being over 1 year since last screening. Dear Dr. Luz Herzog,     Our mutual patient Zoila Kirkpatrick is a 29-year-old female with significant past medical history of COPD, aortic aneurysm, coronary artery disease, long-term and current smoking history, CVA, and osteoporosis. Patient is considered moderate risk for surgery under general anesthesia due to these comorbid conditions. Patient was educated of these risk and was okay to continue with surgery. Patient educated she will need to stop Plavix for 7 days was able to do this in the past without issue. Thank you,   Awilda Bazan PA-C              No follow-ups on file.      Patient given educational materials - see patient instructions. Discussed use, benefit, and side effects of prescribed medications. All patient questions answered. Pt voiced understanding. Reviewed health maintenance. Instructed to continue current medications, diet and exercise. Patient agreed with treatment plan. Follow up as directed. Electronically signed by DAPHNIE Galvan on 2/1/2023 at 2:34 PM  Tobacco Cessation Counseling: Patient advised about behavior change, including information about personal health harms, usage of appropriate cessation measures and benefits of cessation.   Time spent (minutes): 5 min

## 2023-02-09 ENCOUNTER — TELEPHONE (OUTPATIENT)
Dept: PRIMARY CARE CLINIC | Age: 73
End: 2023-02-09

## 2023-02-09 NOTE — TELEPHONE ENCOUNTER
Patient called office asking if Alfredo Camilo could order testing to check \"all bones in the body\" for possible arthritis. Asked patient if she could specify what site is bothering her and could order possible xray for site, pt is wanting \"full body scan\". Patient states sd ordered dexa scan I advised pt dexa scan are for osteoporosis not arthritis.      Please advise

## 2023-02-09 NOTE — TELEPHONE ENCOUNTER
Aware pt of this already when I spoke with her     Will wait for sd response- pt aware sd is out of office and will respond when return

## 2023-02-09 NOTE — TELEPHONE ENCOUNTER
Dexa scan does not check for arthritis. It checks for osteoporosis. She should make appt with Alfredo Page to discuss arthritis/joint pain. I will forward the message to him.

## 2023-02-16 NOTE — TELEPHONE ENCOUNTER
Patient will need appointment to discuss where her pains are. I cannot order full body scan as this is not done for arthritis.  Patient could also follow up with orthopedic office if she would like referral for further eval.

## 2023-02-22 ENCOUNTER — TELEPHONE (OUTPATIENT)
Dept: PRIMARY CARE CLINIC | Age: 73
End: 2023-02-22

## 2023-02-22 DIAGNOSIS — F17.200 TOBACCO DEPENDENCE: Primary | ICD-10-CM

## 2023-02-22 RX ORDER — NICOTINE 21 MG/24HR
1 PATCH, TRANSDERMAL 24 HOURS TRANSDERMAL DAILY
Qty: 42 PATCH | Refills: 0 | Status: SHIPPED | OUTPATIENT
Start: 2023-02-22 | End: 2023-04-05

## 2023-02-22 NOTE — TELEPHONE ENCOUNTER
Pt states that you sent in the patches to [facility], to help her quit smoking and she never got them. Pt got the Wellbutrin tabs and said that they are making her itch like crazy and she wants to know if you would resend the patches for her?

## 2023-03-21 DIAGNOSIS — F17.200 TOBACCO DEPENDENCE: ICD-10-CM

## 2023-03-21 RX ORDER — NICOTINE 21 MG/24HR
1 PATCH, TRANSDERMAL 24 HOURS TRANSDERMAL DAILY
Qty: 42 PATCH | Refills: 0 | Status: SHIPPED | OUTPATIENT
Start: 2023-03-21 | End: 2023-05-02

## 2023-03-21 NOTE — TELEPHONE ENCOUNTER
Mail order told her they can't get the nicotine patches, she can get it at the [facility] aid. Script pended.      Pharmacy [facility]

## 2023-04-14 ENCOUNTER — HOSPITAL ENCOUNTER (OUTPATIENT)
Dept: PREADMISSION TESTING | Age: 73
Discharge: HOME | End: 2023-04-18
Payer: MEDICARE

## 2023-04-14 VITALS
WEIGHT: 162 LBS | TEMPERATURE: 97.8 F | OXYGEN SATURATION: 99 % | SYSTOLIC BLOOD PRESSURE: 164 MMHG | DIASTOLIC BLOOD PRESSURE: 75 MMHG | HEART RATE: 61 BPM | BODY MASS INDEX: 29.81 KG/M2 | RESPIRATION RATE: 18 BRPM | HEIGHT: 62 IN

## 2023-04-14 LAB
ABSOLUTE EOS #: 0.2 K/UL (ref 0–0.4)
ABSOLUTE LYMPH #: 3.1 K/UL (ref 1–4.8)
ABSOLUTE MONO #: 0.8 K/UL (ref 0.1–1.3)
ANION GAP SERPL CALCULATED.3IONS-SCNC: 12 MMOL/L (ref 9–17)
BASOPHILS # BLD: 1 % (ref 0–2)
BASOPHILS ABSOLUTE: 0.1 K/UL (ref 0–0.2)
BUN SERPL-MCNC: 10 MG/DL (ref 8–23)
CALCIUM SERPL-MCNC: 9.3 MG/DL (ref 8.6–10.4)
CHLORIDE SERPL-SCNC: 105 MMOL/L (ref 98–107)
CO2 SERPL-SCNC: 22 MMOL/L (ref 20–31)
CREAT SERPL-MCNC: 0.69 MG/DL (ref 0.5–0.9)
EOSINOPHILS RELATIVE PERCENT: 2 % (ref 0–4)
GFR SERPL CREATININE-BSD FRML MDRD: >60 ML/MIN/1.73M2
GLUCOSE SERPL-MCNC: 106 MG/DL (ref 70–99)
HCT VFR BLD AUTO: 39.8 % (ref 36–46)
HGB BLD-MCNC: 13.2 G/DL (ref 12–16)
LYMPHOCYTES # BLD: 33 % (ref 24–44)
MCH RBC QN AUTO: 28.2 PG (ref 26–34)
MCHC RBC AUTO-ENTMCNC: 33.2 G/DL (ref 31–37)
MCV RBC AUTO: 84.9 FL (ref 80–100)
MONOCYTES # BLD: 8 % (ref 1–7)
PDW BLD-RTO: 14.6 % (ref 11.5–14.9)
PLATELET # BLD AUTO: 337 K/UL (ref 150–450)
PMV BLD AUTO: 9.8 FL (ref 6–12)
POTASSIUM SERPL-SCNC: 3.4 MMOL/L (ref 3.7–5.3)
RBC # BLD: 4.68 M/UL (ref 4–5.2)
SEG NEUTROPHILS: 56 % (ref 36–66)
SEGMENTED NEUTROPHILS ABSOLUTE COUNT: 5.3 K/UL (ref 1.3–9.1)
SODIUM SERPL-SCNC: 139 MMOL/L (ref 135–144)
WBC # BLD AUTO: 9.5 K/UL (ref 3.5–11)

## 2023-04-14 PROCEDURE — 85025 COMPLETE CBC W/AUTO DIFF WBC: CPT

## 2023-04-14 PROCEDURE — 80048 BASIC METABOLIC PNL TOTAL CA: CPT

## 2023-04-14 PROCEDURE — 36415 COLL VENOUS BLD VENIPUNCTURE: CPT

## 2023-04-14 ASSESSMENT — PAIN DESCRIPTION - LOCATION: LOCATION: BACK

## 2023-04-14 ASSESSMENT — PAIN SCALES - GENERAL: PAINLEVEL_OUTOF10: 7

## 2023-04-14 ASSESSMENT — PAIN DESCRIPTION - PAIN TYPE: TYPE: CHRONIC PAIN

## 2023-04-14 ASSESSMENT — PAIN DESCRIPTION - ORIENTATION: ORIENTATION: LOWER

## 2023-04-14 ASSESSMENT — PAIN DESCRIPTION - DESCRIPTORS: DESCRIPTORS: ACHING

## 2023-04-14 NOTE — DISCHARGE INSTRUCTIONS
Pre-op Instructions For Out-Patient Surgery    Medication Instructions:  Please stop herbs and any supplements now (includes vitamins and minerals). Please contact your surgeon and prescribing physician for pre-op instructions for any blood thinners. Plavix as directed. If you have inhalers/aerosol treatments at home, please use them the morning of your surgery and bring the inhalers with you to the hospital.    Please take the following medications the morning of your surgery with a sip of water:    nebulizer/inhalers, metoprolol, amlodipine, omeprazole    Surgery Instructions:  After midnight before surgery:  Do not eat or drink anything, including water, mints, gum, and hard candy. You may brush your teeth without swallowing. No smoking, chewing tobacco, or street drugs. Please shower or bathe before surgery. If you were given Surgical Scrub Chlorhexidine Gluconate Liquid (CHG), please shower the night before and the morning of your surgery following the detailed instructions you received during your pre-admission visit. Please do not wear any cologne, lotion, powder, deodorant, jewelry, piercings, perfume, makeup, nail polish, hair accessories, or hair spray on the day of surgery. Wear loose comfortable clothing. Leave your valuables at home. Bring a storage case for any glasses/contacts. An adult who is responsible for you MUST drive you home and should be with you for the first 24 hours after surgery. If having out-patient knee and foot surgeries, please arrange for planned crutches, walker, or wheelchair before arriving to the hospital.    The Day of Surgery:  Arrive at [address] [place] [address] Surgery Entrance at the time directed by your surgeon and check in at the desk. If you have a living will or healthcare power of , please bring a copy. You will be taken to the pre-op holding area where you will be prepared for surgery.   A physical

## 2023-04-20 ENCOUNTER — TELEMEDICINE (OUTPATIENT)
Dept: PRIMARY CARE CLINIC | Age: 73
End: 2023-04-20

## 2023-04-20 DIAGNOSIS — K52.9 GASTROENTERITIS: Primary | ICD-10-CM

## 2023-04-20 DIAGNOSIS — R19.7 DIARRHEA, UNSPECIFIED TYPE: ICD-10-CM

## 2023-04-20 RX ORDER — DIPHENOXYLATE HYDROCHLORIDE AND ATROPINE SULFATE 2.5; .025 MG/1; MG/1
1 TABLET ORAL 3 TIMES DAILY PRN
Qty: 30 TABLET | Refills: 0 | Status: SHIPPED | OUTPATIENT
Start: 2023-04-20 | End: 2023-04-30

## 2023-04-20 ASSESSMENT — ENCOUNTER SYMPTOMS
BLOATING: 0
ABDOMINAL PAIN: 0
DIARRHEA: 1
FLATUS: 1
VOMITING: 0

## 2023-04-20 NOTE — PROGRESS NOTES
this call serves to triage the patient into an appointment for the relevant concern    RENO Ibrahim CNP     Reviewed prior notes: Previous PCP   Reviewed previous:        LDL Calculated (mg/dL)   Date Value   09/02/2021 153       (goal LDL is <100)   AST (U/L)   Date Value   01/09/2014 21     ALT (U/L)   Date Value   01/09/2014 13     BUN (mg/dL)   Date Value   04/14/2023 10     BP Readings from Last 3 Encounters:   04/14/23 (!) 164/75   02/01/23 136/60   01/19/23 (!) 152/68          (goal 120/80)    Past Medical History:   Diagnosis Date    Arthritic-like pain     Arthritis     Blind left eye     cant see color    CAD (coronary artery disease)     Cancer ([address]     uterus,lt kidney    Chest pain 01/08/2014    Chronic kidney disease     COPD (chronic obstructive pulmonary disease) ([facility]     DVT (deep venous thrombosis) ([facility] 2014    Rt. upper arm, after hear surgery for PFO    Full dentures     GERD (gastroesophageal reflux disease)     History of blood transfusion     after spleen surgery    Hyperlipidemia     Hypertension     Lazy eye     Rt.     Lung nodules     bilateral, gets yearly Ct scan, last one in 4/2022    Osteoporosis     Peptic ulcer     PFO (patent foramen ovale) 01/10/2014    closure of PFO    Short-term memory loss     due to multiple strokes    Stroke syndrome     x 15  sept 2020 last one    TIA (transient ischemic attack)     Wears glasses       Past Surgical History:   Procedure Laterality Date    APPENDECTOMY      BACK SURGERY      fusion L5    CARDIAC CATHETERIZATION      PFO repaired    CARPAL TUNNEL RELEASE Right     COLONOSCOPY      EXCISION/BIOPSY  11/17/2021     FOREHEAD BASAL CELL LESION BIOPSY EXCISION possible GRAFT possible FLAP * WITH FROZEN SECTION PATHOLOGY REQUIRED    FACIAL SURGERY N/A 11/17/2021    FOREHEAD BASAL CELL LESION BIOPSY EXCISION possible GRAFT possible FLAP * WITH FROZEN SECTION PATHOLOGY REQUIRED performed by Krysta Weems MD at [facility]

## 2023-04-21 DIAGNOSIS — J45.20 MILD INTERMITTENT ASTHMA, UNSPECIFIED WHETHER COMPLICATED: ICD-10-CM

## 2023-04-21 RX ORDER — ALBUTEROL SULFATE 90 UG/1
AEROSOL, METERED RESPIRATORY (INHALATION)
Qty: 25.5 G | Refills: 3 | Status: SHIPPED | OUTPATIENT
Start: 2023-04-21

## 2023-04-28 DIAGNOSIS — R19.7 DIARRHEA, UNSPECIFIED TYPE: ICD-10-CM

## 2023-05-01 DIAGNOSIS — R19.7 DIARRHEA, UNSPECIFIED TYPE: Primary | ICD-10-CM

## 2023-05-01 RX ORDER — DIPHENOXYLATE HYDROCHLORIDE AND ATROPINE SULFATE 2.5; .025 MG/1; MG/1
1 TABLET ORAL 3 TIMES DAILY PRN
Qty: 30 TABLET | Refills: 0 | Status: SHIPPED | OUTPATIENT
Start: 2023-05-01 | End: 2023-05-11

## 2023-07-12 ENCOUNTER — HOSPITAL ENCOUNTER (OUTPATIENT)
Dept: PREADMISSION TESTING | Age: 73
Discharge: HOME | End: 2023-07-16

## 2023-07-12 VITALS — WEIGHT: 159 LBS | BODY MASS INDEX: 29.26 KG/M2 | HEIGHT: 62 IN

## 2023-07-19 ENCOUNTER — ANESTHESIA EVENT (OUTPATIENT)
Dept: OPERATING ROOM | Age: 73
End: 2023-07-19
Payer: MEDICARE

## 2023-07-19 NOTE — PRE-PROCEDURE INSTRUCTIONS
No answer, left message ? N/A                            Unable to leave message ? N/A    When were you told to arrive at hospital ?1115     Do you have a  ? Yes    Are you on any blood thinners ? Yes                  If yes when did you stop taking ? Pt told she did not have to stop blood thinners    Do you have your prep Rx filled and instruction ? N/A    Nothing to eat the day before , only clear liquids. Yes    Are you experiencing any covid symptoms ? No    Do you have any infections or rash we should be aware of ? No      Do you have the Hibiclens soap to use the night before and the morning of surgery ? N/A    Nothing to eat or drink after midnight, only a sip of water to take any medication instructed to take the night before. Yes  Wear comfortable clothing, leave any valuables at home, remove any jewelry and body piercing . Yes

## 2023-07-19 NOTE — PROGRESS NOTES
Spoke with Patricia Sizer at Dr Miranda Reese office regarding clearance note of 3/23/23 of Dr Apollo Ford, surgery had to be rescheduled from April and no new note of clearance was required by the office.

## 2023-07-20 ENCOUNTER — ANESTHESIA (OUTPATIENT)
Dept: OPERATING ROOM | Age: 73
End: 2023-07-20
Payer: MEDICARE

## 2023-07-20 ENCOUNTER — HOSPITAL ENCOUNTER (OUTPATIENT)
Age: 73
Setting detail: OUTPATIENT SURGERY
Discharge: HOME | End: 2023-07-20
Attending: STUDENT IN AN ORGANIZED HEALTH CARE EDUCATION/TRAINING PROGRAM | Admitting: STUDENT IN AN ORGANIZED HEALTH CARE EDUCATION/TRAINING PROGRAM
Payer: MEDICARE

## 2023-07-20 VITALS
RESPIRATION RATE: 16 BRPM | OXYGEN SATURATION: 95 % | WEIGHT: 159 LBS | HEIGHT: 62 IN | TEMPERATURE: 97.1 F | HEART RATE: 54 BPM | SYSTOLIC BLOOD PRESSURE: 138 MMHG | DIASTOLIC BLOOD PRESSURE: 64 MMHG | BODY MASS INDEX: 29.26 KG/M2

## 2023-07-20 PROCEDURE — 3600000012 HC SURGERY LEVEL 2 ADDTL 15MIN: Performed by: STUDENT IN AN ORGANIZED HEALTH CARE EDUCATION/TRAINING PROGRAM

## 2023-07-20 PROCEDURE — 6360000002 HC RX W HCPCS: Performed by: STUDENT IN AN ORGANIZED HEALTH CARE EDUCATION/TRAINING PROGRAM

## 2023-07-20 PROCEDURE — 7100000030 HC ASPR PHASE II RECOVERY - FIRST 15 MIN: Performed by: STUDENT IN AN ORGANIZED HEALTH CARE EDUCATION/TRAINING PROGRAM

## 2023-07-20 PROCEDURE — 6370000000 HC RX 637 (ALT 250 FOR IP): Performed by: STUDENT IN AN ORGANIZED HEALTH CARE EDUCATION/TRAINING PROGRAM

## 2023-07-20 PROCEDURE — 3700000001 HC ADD 15 MINUTES (ANESTHESIA): Performed by: STUDENT IN AN ORGANIZED HEALTH CARE EDUCATION/TRAINING PROGRAM

## 2023-07-20 PROCEDURE — V2632 POST CHMBR INTRAOCULAR LENS: HCPCS | Performed by: STUDENT IN AN ORGANIZED HEALTH CARE EDUCATION/TRAINING PROGRAM

## 2023-07-20 PROCEDURE — 3700000000 HC ANESTHESIA ATTENDED CARE: Performed by: STUDENT IN AN ORGANIZED HEALTH CARE EDUCATION/TRAINING PROGRAM

## 2023-07-20 PROCEDURE — 2709999900 HC NON-CHARGEABLE SUPPLY: Performed by: STUDENT IN AN ORGANIZED HEALTH CARE EDUCATION/TRAINING PROGRAM

## 2023-07-20 PROCEDURE — 3600000002 HC SURGERY LEVEL 2 BASE: Performed by: STUDENT IN AN ORGANIZED HEALTH CARE EDUCATION/TRAINING PROGRAM

## 2023-07-20 PROCEDURE — 2500000003 HC RX 250 WO HCPCS: Performed by: STUDENT IN AN ORGANIZED HEALTH CARE EDUCATION/TRAINING PROGRAM

## 2023-07-20 PROCEDURE — 2580000003 HC RX 258: Performed by: ANESTHESIOLOGY

## 2023-07-20 PROCEDURE — 7100000001 HC PACU RECOVERY - ADDTL 15 MIN: Performed by: STUDENT IN AN ORGANIZED HEALTH CARE EDUCATION/TRAINING PROGRAM

## 2023-07-20 PROCEDURE — 2500000003 HC RX 250 WO HCPCS: Performed by: NURSE ANESTHETIST, CERTIFIED REGISTERED

## 2023-07-20 PROCEDURE — 7100000010 HC PHASE II RECOVERY - FIRST 15 MIN: Performed by: STUDENT IN AN ORGANIZED HEALTH CARE EDUCATION/TRAINING PROGRAM

## 2023-07-20 PROCEDURE — 2500000003 HC RX 250 WO HCPCS: Performed by: ANESTHESIOLOGY

## 2023-07-20 PROCEDURE — 7100000011 HC PHASE II RECOVERY - ADDTL 15 MIN: Performed by: STUDENT IN AN ORGANIZED HEALTH CARE EDUCATION/TRAINING PROGRAM

## 2023-07-20 PROCEDURE — 7100000000 HC PACU RECOVERY - FIRST 15 MIN: Performed by: STUDENT IN AN ORGANIZED HEALTH CARE EDUCATION/TRAINING PROGRAM

## 2023-07-20 PROCEDURE — 6360000002 HC RX W HCPCS: Performed by: NURSE ANESTHETIST, CERTIFIED REGISTERED

## 2023-07-20 PROCEDURE — 7100000031 HC ASPR PHASE II RECOVERY - ADDTL 15 MIN: Performed by: STUDENT IN AN ORGANIZED HEALTH CARE EDUCATION/TRAINING PROGRAM

## 2023-07-20 DEVICE — LENS IOL POST 1-PC 6X13 19.0.: Type: IMPLANTABLE DEVICE | Site: EYE | Status: FUNCTIONAL

## 2023-07-20 RX ORDER — EPINEPHRINE 1 MG/ML
INJECTION, SOLUTION, CONCENTRATE INTRAVENOUS PRN
Status: DISCONTINUED | OUTPATIENT
Start: 2023-07-20 | End: 2023-07-20 | Stop reason: ALTCHOICE

## 2023-07-20 RX ORDER — TROPICAMIDE 10 MG/ML
1 SOLUTION/ DROPS OPHTHALMIC EVERY 5 MIN PRN
Status: COMPLETED | OUTPATIENT
Start: 2023-07-20 | End: 2023-07-20

## 2023-07-20 RX ORDER — NEOMYCIN SULFATE, POLYMYXIN B SULFATE, AND DEXAMETHASONE 3.5; 10000; 1 MG/G; [USP'U]/G; MG/G
OINTMENT OPHTHALMIC PRN
Status: DISCONTINUED | OUTPATIENT
Start: 2023-07-20 | End: 2023-07-20 | Stop reason: ALTCHOICE

## 2023-07-20 RX ORDER — TETRACAINE HYDROCHLORIDE 5 MG/ML
1 SOLUTION OPHTHALMIC SEE ADMIN INSTRUCTIONS
Status: DISCONTINUED | OUTPATIENT
Start: 2023-07-20 | End: 2023-07-20 | Stop reason: HOSPADM

## 2023-07-20 RX ORDER — BALANCED SALT SOLUTION ENRICHED WITH BICARBONATE, DEXTROSE, AND GLUTATHIONE
KIT INTRAOCULAR PRN
Status: DISCONTINUED | OUTPATIENT
Start: 2023-07-20 | End: 2023-07-20 | Stop reason: ALTCHOICE

## 2023-07-20 RX ORDER — SODIUM CHLORIDE 0.9 % (FLUSH) 0.9 %
5-40 SYRINGE (ML) INJECTION PRN
Status: DISCONTINUED | OUTPATIENT
Start: 2023-07-20 | End: 2023-07-20 | Stop reason: HOSPADM

## 2023-07-20 RX ORDER — METOCLOPRAMIDE HYDROCHLORIDE 5 MG/ML
10 INJECTION INTRAMUSCULAR; INTRAVENOUS
Status: DISCONTINUED | OUTPATIENT
Start: 2023-07-20 | End: 2023-07-20 | Stop reason: HOSPADM

## 2023-07-20 RX ORDER — SODIUM CHLORIDE 0.9 % (FLUSH) 0.9 %
5-40 SYRINGE (ML) INJECTION EVERY 12 HOURS SCHEDULED
Status: DISCONTINUED | OUTPATIENT
Start: 2023-07-20 | End: 2023-07-20 | Stop reason: HOSPADM

## 2023-07-20 RX ORDER — SODIUM CHLORIDE 9 MG/ML
INJECTION, SOLUTION INTRAVENOUS PRN
Status: DISCONTINUED | OUTPATIENT
Start: 2023-07-20 | End: 2023-07-20 | Stop reason: HOSPADM

## 2023-07-20 RX ORDER — LIDOCAINE HYDROCHLORIDE 10 MG/ML
INJECTION, SOLUTION EPIDURAL; INFILTRATION; INTRACAUDAL; PERINEURAL PRN
Status: DISCONTINUED | OUTPATIENT
Start: 2023-07-20 | End: 2023-07-20 | Stop reason: SDUPTHER

## 2023-07-20 RX ORDER — PROPOFOL 10 MG/ML
INJECTION, EMULSION INTRAVENOUS PRN
Status: DISCONTINUED | OUTPATIENT
Start: 2023-07-20 | End: 2023-07-20 | Stop reason: SDUPTHER

## 2023-07-20 RX ORDER — FENTANYL CITRATE 0.05 MG/ML
25 INJECTION, SOLUTION INTRAMUSCULAR; INTRAVENOUS EVERY 5 MIN PRN
Status: DISCONTINUED | OUTPATIENT
Start: 2023-07-20 | End: 2023-07-20 | Stop reason: HOSPADM

## 2023-07-20 RX ORDER — ACETAMINOPHEN 325 MG/1
650 TABLET ORAL
Status: DISCONTINUED | OUTPATIENT
Start: 2023-07-20 | End: 2023-07-20 | Stop reason: HOSPADM

## 2023-07-20 RX ORDER — HYDRALAZINE HYDROCHLORIDE 20 MG/ML
10 INJECTION INTRAMUSCULAR; INTRAVENOUS
Status: DISCONTINUED | OUTPATIENT
Start: 2023-07-20 | End: 2023-07-20 | Stop reason: HOSPADM

## 2023-07-20 RX ORDER — KETOROLAC TROMETHAMINE 5 MG/ML
1 SOLUTION OPHTHALMIC EVERY 5 MIN PRN
Status: COMPLETED | OUTPATIENT
Start: 2023-07-20 | End: 2023-07-20

## 2023-07-20 RX ORDER — CIPROFLOXACIN HYDROCHLORIDE 3.5 MG/ML
SOLUTION/ DROPS TOPICAL PRN
Status: DISCONTINUED | OUTPATIENT
Start: 2023-07-20 | End: 2023-07-20 | Stop reason: ALTCHOICE

## 2023-07-20 RX ORDER — DEXAMETHASONE SODIUM PHOSPHATE 4 MG/ML
INJECTION, SOLUTION INTRA-ARTICULAR; INTRALESIONAL; INTRAMUSCULAR; INTRAVENOUS; SOFT TISSUE PRN
Status: DISCONTINUED | OUTPATIENT
Start: 2023-07-20 | End: 2023-07-20 | Stop reason: SDUPTHER

## 2023-07-20 RX ORDER — ONDANSETRON 2 MG/ML
4 INJECTION INTRAMUSCULAR; INTRAVENOUS
Status: DISCONTINUED | OUTPATIENT
Start: 2023-07-20 | End: 2023-07-20 | Stop reason: HOSPADM

## 2023-07-20 RX ORDER — MEPERIDINE HYDROCHLORIDE 25 MG/ML
12.5 INJECTION INTRAMUSCULAR; INTRAVENOUS; SUBCUTANEOUS EVERY 5 MIN PRN
Status: DISCONTINUED | OUTPATIENT
Start: 2023-07-20 | End: 2023-07-20 | Stop reason: HOSPADM

## 2023-07-20 RX ORDER — LIDOCAINE HYDROCHLORIDE 10 MG/ML
1 INJECTION, SOLUTION EPIDURAL; INFILTRATION; INTRACAUDAL; PERINEURAL
Status: COMPLETED | OUTPATIENT
Start: 2023-07-20 | End: 2023-07-20

## 2023-07-20 RX ORDER — PHENYLEPHRINE HCL 2.5 %
1 DROPS OPHTHALMIC (EYE) EVERY 5 MIN PRN
Status: COMPLETED | OUTPATIENT
Start: 2023-07-20 | End: 2023-07-20

## 2023-07-20 RX ORDER — LABETALOL HYDROCHLORIDE 5 MG/ML
10 INJECTION, SOLUTION INTRAVENOUS
Status: DISCONTINUED | OUTPATIENT
Start: 2023-07-20 | End: 2023-07-20 | Stop reason: HOSPADM

## 2023-07-20 RX ORDER — ONDANSETRON 2 MG/ML
INJECTION INTRAMUSCULAR; INTRAVENOUS PRN
Status: DISCONTINUED | OUTPATIENT
Start: 2023-07-20 | End: 2023-07-20 | Stop reason: SDUPTHER

## 2023-07-20 RX ORDER — SODIUM CHLORIDE, SODIUM LACTATE, POTASSIUM CHLORIDE, CALCIUM CHLORIDE 600; 310; 30; 20 MG/100ML; MG/100ML; MG/100ML; MG/100ML
INJECTION, SOLUTION INTRAVENOUS CONTINUOUS
Status: DISCONTINUED | OUTPATIENT
Start: 2023-07-20 | End: 2023-07-20 | Stop reason: HOSPADM

## 2023-07-20 RX ORDER — CIPROFLOXACIN HYDROCHLORIDE 3.5 MG/ML
1 SOLUTION/ DROPS TOPICAL EVERY 5 MIN PRN
Status: COMPLETED | OUTPATIENT
Start: 2023-07-20 | End: 2023-07-20

## 2023-07-20 RX ORDER — MIDAZOLAM HYDROCHLORIDE 1 MG/ML
INJECTION INTRAMUSCULAR; INTRAVENOUS PRN
Status: DISCONTINUED | OUTPATIENT
Start: 2023-07-20 | End: 2023-07-20 | Stop reason: SDUPTHER

## 2023-07-20 RX ORDER — DIPHENHYDRAMINE HYDROCHLORIDE 50 MG/ML
12.5 INJECTION INTRAMUSCULAR; INTRAVENOUS
Status: DISCONTINUED | OUTPATIENT
Start: 2023-07-20 | End: 2023-07-20 | Stop reason: HOSPADM

## 2023-07-20 RX ADMIN — PHENYLEPHRINE HYDROCHLORIDE 1 DROP: 25 SOLUTION/ DROPS OPHTHALMIC at 11:43

## 2023-07-20 RX ADMIN — KETOROLAC TROMETHAMINE 1 DROP: 5 SOLUTION OPHTHALMIC at 11:38

## 2023-07-20 RX ADMIN — TROPICAMIDE 1 DROP: 10 SOLUTION/ DROPS OPHTHALMIC at 11:38

## 2023-07-20 RX ADMIN — TROPICAMIDE 1 DROP: 10 SOLUTION/ DROPS OPHTHALMIC at 11:43

## 2023-07-20 RX ADMIN — CIPROFLOXACIN 1 DROP: 3 SOLUTION OPHTHALMIC at 11:48

## 2023-07-20 RX ADMIN — PHENYLEPHRINE HYDROCHLORIDE 1 DROP: 25 SOLUTION/ DROPS OPHTHALMIC at 11:38

## 2023-07-20 RX ADMIN — PROPOFOL 150 MG: 10 INJECTION, EMULSION INTRAVENOUS at 13:45

## 2023-07-20 RX ADMIN — MIDAZOLAM 2 MG: 1 INJECTION INTRAMUSCULAR; INTRAVENOUS at 13:45

## 2023-07-20 RX ADMIN — CIPROFLOXACIN 1 DROP: 3 SOLUTION OPHTHALMIC at 11:43

## 2023-07-20 RX ADMIN — TETRACAINE HYDROCHLORIDE 1 DROP: 5 SOLUTION OPHTHALMIC at 11:38

## 2023-07-20 RX ADMIN — TROPICAMIDE 1 DROP: 10 SOLUTION/ DROPS OPHTHALMIC at 11:48

## 2023-07-20 RX ADMIN — PHENYLEPHRINE HYDROCHLORIDE 1 DROP: 25 SOLUTION/ DROPS OPHTHALMIC at 11:48

## 2023-07-20 RX ADMIN — KETOROLAC TROMETHAMINE 1 DROP: 5 SOLUTION OPHTHALMIC at 11:44

## 2023-07-20 RX ADMIN — CIPROFLOXACIN 1 DROP: 3 SOLUTION OPHTHALMIC at 11:38

## 2023-07-20 RX ADMIN — KETOROLAC TROMETHAMINE 1 DROP: 5 SOLUTION OPHTHALMIC at 11:48

## 2023-07-20 RX ADMIN — LIDOCAINE HYDROCHLORIDE 1 ML: 10 INJECTION, SOLUTION EPIDURAL; INFILTRATION; INTRACAUDAL; PERINEURAL at 11:43

## 2023-07-20 RX ADMIN — LIDOCAINE HYDROCHLORIDE 40 MG: 10 INJECTION, SOLUTION EPIDURAL; INFILTRATION; INTRACAUDAL; PERINEURAL at 13:45

## 2023-07-20 RX ADMIN — ONDANSETRON 4 MG: 2 INJECTION INTRAMUSCULAR; INTRAVENOUS at 13:54

## 2023-07-20 RX ADMIN — DEXAMETHASONE SODIUM PHOSPHATE 4 MG: 4 INJECTION, SOLUTION INTRAMUSCULAR; INTRAVENOUS at 13:54

## 2023-07-20 RX ADMIN — SODIUM CHLORIDE, POTASSIUM CHLORIDE, SODIUM LACTATE AND CALCIUM CHLORIDE: 600; 310; 30; 20 INJECTION, SOLUTION INTRAVENOUS at 11:42

## 2023-07-20 ASSESSMENT — COPD QUESTIONNAIRES: CAT_SEVERITY: NO INTERVAL CHANGE

## 2023-07-20 ASSESSMENT — PAIN DESCRIPTION - DESCRIPTORS: DESCRIPTORS: BURNING

## 2023-07-20 ASSESSMENT — PAIN DESCRIPTION - ORIENTATION: ORIENTATION: RIGHT

## 2023-07-20 ASSESSMENT — ENCOUNTER SYMPTOMS
SHORTNESS OF BREATH: 0
STRIDOR: 0

## 2023-07-20 ASSESSMENT — PAIN DESCRIPTION - ONSET: ONSET: GRADUAL

## 2023-07-20 ASSESSMENT — LIFESTYLE VARIABLES: SMOKING_STATUS: 0

## 2023-07-20 ASSESSMENT — PAIN DESCRIPTION - PAIN TYPE: TYPE: SURGICAL PAIN

## 2023-07-20 ASSESSMENT — PAIN SCALES - GENERAL: PAINLEVEL_OUTOF10: 4

## 2023-07-20 ASSESSMENT — PAIN DESCRIPTION - LOCATION: LOCATION: EYE

## 2023-07-20 ASSESSMENT — PAIN - FUNCTIONAL ASSESSMENT: PAIN_FUNCTIONAL_ASSESSMENT: 0-10

## 2023-07-20 NOTE — H&P
Camille Arias is a 68y.o. year old who presents for elective outpatient ophthalmic surgery with Sharri Albert MD.  The patient complains of decreased vision, glare and halos around lights, and trouble with vision for activities of daily living. Past Medical History:   Diagnosis Date    Arthritis     Cancer ([address])     uterus,lt kidney    Chest pain 01/08/2014    Chronic kidney disease     left    COPD (chronic obstructive pulmonary disease) ([facility])     DVT (deep venous thrombosis) ([address]) 2014    Rt. upper arm, after hear surgery for PFO    Full dentures     GERD (gastroesophageal reflux disease)     History of blood transfusion     after spleen surgery    Hyperlipidemia     Hypertension     Lazy eye     Rt.     Lung nodules     bilateral, gets yearly Ct scan, last one in 4/2022    Osteoporosis     Peptic ulcer     PFO (patent foramen ovale) 01/10/2014    had a closure of PFO    Short-term memory loss     due to multiple strokes    Stroke syndrome     x 15  sept 2020 last one    TIA (transient ischemic attack)     Vitamin B12 deficiency     Vitamin D deficiency     Wears glasses        Past Surgical History:   Procedure Laterality Date    APPENDECTOMY      BACK SURGERY      fusion L5    CARDIAC CATHETERIZATION      PFO repaired    CARPAL TUNNEL RELEASE Right     COLONOSCOPY      EXCISION/BIOPSY  11/17/2021     FOREHEAD BASAL CELL LESION BIOPSY EXCISION possible GRAFT possible FLAP * WITH FROZEN SECTION PATHOLOGY REQUIRED    FACIAL SURGERY N/A 11/17/2021    FOREHEAD BASAL CELL LESION BIOPSY EXCISION possible GRAFT possible FLAP * WITH FROZEN SECTION PATHOLOGY REQUIRED performed by Shubham Herrera MD at [address]     thumb cartilage    HYSTERECTOMY (CERVIX STATUS UNKNOWN)  1979    ovaries remained but have shrunk to invisibility    INTRACAPSULAR CATARACT EXTRACTION Left 01/19/2023    EYE CATARACT EMULSIFICATION IOL IMPLANT performed by Sharri Albert MD at [facility]

## 2023-07-20 NOTE — OP NOTE
Roro Barryock [id number] 68 y.o. OPERATIVE NOTE     Preoperative Diagnosis: Cataract, right eye     Postoperative Diagnosis: Cataract, right eye     Procedure: Phacoemulsification with intraocular lens implantation, right eye     Surgeon: Jennifer Jennings MD     Anesthesia: General    Complications: none     Specimens: none     EBL: none     Indications for procedure: The patient is a 68y.o. year old with decreased vision, glare and halos around lights, and trouble with activities of daily living. Examination revealed a visually significant cataract in the the right eye. Risks, benefits, and alternatives to surgery were discussed with the patient and the patient elected to proceed with phacoemulsification with lens implantation. Details of the procedure: Following informed consent, the patient was taken to the operating room and placed in the supine position. The eye was prepped and draped in the usual sterile fashion using aseptic technique for cataract surgery and a lid speculum was placed between the lids. Several drops of topical .5% Marcaine were place on the eye. A crescent blade was use to make a 2 mm tunnel at the limbus into clear cornea and a keratome blade was used to enter the eye temporally. A side port incision was made in the inferiorly. A small amount of preservative-free epishugarcaine was placed in the eye. The eye was filled with viscoelastic and a continuous tear capsulorhexis was performed. The lens was hydrodissected and hydrodilineated with balanced salt solution. Phacoemulsification was performed in a divide and conquer technique. Irrigation/aspiration was used to remove all cortical material from the capsular bag. The eye was filled with viscoelastic and a foldable posterior chamber intraocular lens was injected into the capsular bag and rotated into position model SN60WF 19.0 diopter power. Irrigation/aspiration was used to remove all excess viscoelastic.   The eye was

## 2023-08-16 RX ORDER — CLOPIDOGREL BISULFATE 75 MG/1
75 TABLET ORAL DAILY
Qty: 90 TABLET | Refills: 3 | Status: SHIPPED | OUTPATIENT
Start: 2023-08-16

## 2023-09-08 DIAGNOSIS — K21.9 GASTROESOPHAGEAL REFLUX DISEASE, UNSPECIFIED WHETHER ESOPHAGITIS PRESENT: ICD-10-CM

## 2023-09-08 RX ORDER — OMEPRAZOLE 40 MG/1
40 CAPSULE, DELAYED RELEASE ORAL DAILY
Qty: 90 CAPSULE | Refills: 3 | OUTPATIENT
Start: 2023-09-08

## 2023-09-11 ENCOUNTER — OFFICE VISIT (OUTPATIENT)
Dept: PRIMARY CARE CLINIC | Age: 73
End: 2023-09-11
Payer: MEDICARE

## 2023-09-11 VITALS
BODY MASS INDEX: 29.77 KG/M2 | DIASTOLIC BLOOD PRESSURE: 70 MMHG | WEIGHT: 161.8 LBS | HEART RATE: 63 BPM | HEIGHT: 62 IN | SYSTOLIC BLOOD PRESSURE: 130 MMHG | OXYGEN SATURATION: 97 %

## 2023-09-11 DIAGNOSIS — I25.10 CORONARY ARTERY DISEASE DUE TO LIPID RICH PLAQUE: ICD-10-CM

## 2023-09-11 DIAGNOSIS — E78.5 HYPERLIPIDEMIA, UNSPECIFIED HYPERLIPIDEMIA TYPE: ICD-10-CM

## 2023-09-11 DIAGNOSIS — K21.9 GASTROESOPHAGEAL REFLUX DISEASE, UNSPECIFIED WHETHER ESOPHAGITIS PRESENT: ICD-10-CM

## 2023-09-11 DIAGNOSIS — Z87.891 PERSONAL HISTORY OF TOBACCO USE: ICD-10-CM

## 2023-09-11 DIAGNOSIS — Z87.891 PERSONAL HISTORY OF TOBACCO USE, PRESENTING HAZARDS TO HEALTH: ICD-10-CM

## 2023-09-11 DIAGNOSIS — R53.83 OTHER FATIGUE: ICD-10-CM

## 2023-09-11 DIAGNOSIS — Z78.0 MENOPAUSE: ICD-10-CM

## 2023-09-11 DIAGNOSIS — J43.1 PANLOBULAR EMPHYSEMA (HCC): ICD-10-CM

## 2023-09-11 DIAGNOSIS — R93.89 ABNORMAL FINDINGS ON DIAGNOSTIC IMAGING OF OTHER SPECIFIED BODY STRUCTURES: ICD-10-CM

## 2023-09-11 DIAGNOSIS — I25.83 CORONARY ARTERY DISEASE DUE TO LIPID RICH PLAQUE: ICD-10-CM

## 2023-09-11 DIAGNOSIS — Z00.00 MEDICARE ANNUAL WELLNESS VISIT, SUBSEQUENT: Primary | ICD-10-CM

## 2023-09-11 DIAGNOSIS — M62.838 MUSCLE SPASM: ICD-10-CM

## 2023-09-11 PROBLEM — C64.9 CLEAR CELL CARCINOMA OF KIDNEY (HCC): Status: ACTIVE | Noted: 2023-09-11

## 2023-09-11 PROCEDURE — G0439 PPPS, SUBSEQ VISIT: HCPCS | Performed by: PHYSICIAN ASSISTANT

## 2023-09-11 PROCEDURE — G0296 VISIT TO DETERM LDCT ELIG: HCPCS | Performed by: PHYSICIAN ASSISTANT

## 2023-09-11 PROCEDURE — 3017F COLORECTAL CA SCREEN DOC REV: CPT | Performed by: PHYSICIAN ASSISTANT

## 2023-09-11 PROCEDURE — 1123F ACP DISCUSS/DSCN MKR DOCD: CPT | Performed by: PHYSICIAN ASSISTANT

## 2023-09-11 RX ORDER — TRAZODONE HYDROCHLORIDE 50 MG/1
50 TABLET ORAL NIGHTLY
Qty: 30 TABLET | Refills: 0 | Status: SHIPPED | OUTPATIENT
Start: 2023-09-11

## 2023-09-11 RX ORDER — LANSOPRAZOLE 30 MG/1
30 CAPSULE, DELAYED RELEASE ORAL DAILY
Qty: 90 CAPSULE | Refills: 1 | Status: SHIPPED | OUTPATIENT
Start: 2023-09-11

## 2023-09-11 ASSESSMENT — PATIENT HEALTH QUESTIONNAIRE - PHQ9
1. LITTLE INTEREST OR PLEASURE IN DOING THINGS: 0
10. IF YOU CHECKED OFF ANY PROBLEMS, HOW DIFFICULT HAVE THESE PROBLEMS MADE IT FOR YOU TO DO YOUR WORK, TAKE CARE OF THINGS AT HOME, OR GET ALONG WITH OTHER PEOPLE: 0
SUM OF ALL RESPONSES TO PHQ QUESTIONS 1-9: 9
7. TROUBLE CONCENTRATING ON THINGS, SUCH AS READING THE NEWSPAPER OR WATCHING TELEVISION: 0
2. FEELING DOWN, DEPRESSED OR HOPELESS: 0
9. THOUGHTS THAT YOU WOULD BE BETTER OFF DEAD, OR OF HURTING YOURSELF: 0
4. FEELING TIRED OR HAVING LITTLE ENERGY: 3
SUM OF ALL RESPONSES TO PHQ QUESTIONS 1-9: 9
SUM OF ALL RESPONSES TO PHQ QUESTIONS 1-9: 9
6. FEELING BAD ABOUT YOURSELF - OR THAT YOU ARE A FAILURE OR HAVE LET YOURSELF OR YOUR FAMILY DOWN: 0
SUM OF ALL RESPONSES TO PHQ9 QUESTIONS 1 & 2: 0
5. POOR APPETITE OR OVEREATING: 3
3. TROUBLE FALLING OR STAYING ASLEEP: 3
SUM OF ALL RESPONSES TO PHQ QUESTIONS 1-9: 9
8. MOVING OR SPEAKING SO SLOWLY THAT OTHER PEOPLE COULD HAVE NOTICED. OR THE OPPOSITE, BEING SO FIGETY OR RESTLESS THAT YOU HAVE BEEN MOVING AROUND A LOT MORE THAN USUAL: 0

## 2023-09-11 ASSESSMENT — LIFESTYLE VARIABLES
HOW OFTEN DO YOU HAVE A DRINK CONTAINING ALCOHOL: NEVER
HOW MANY STANDARD DRINKS CONTAINING ALCOHOL DO YOU HAVE ON A TYPICAL DAY: PATIENT DOES NOT DRINK

## 2023-09-11 NOTE — PROGRESS NOTES
Medicare Annual Wellness Visit    Haroon Louis is here for Medicare AWV (Pt has hard time sleeping at night since stroke in 2021, wants voltaren gel)    Assessment & Plan   Medicare annual wellness visit, subsequent  Gastroesophageal reflux disease, unspecified whether esophagitis present  -     lansoprazole (PREVACID) 30 MG delayed release capsule; Take 1 capsule by mouth daily, Disp-90 capsule, R-1Normal  Coronary artery disease due to lipid rich plaque  -     Lipid, Fasting; Future  -     Comprehensive Metabolic Panel; Future  -     Hepatitis C Antibody; Future  -     CBC with Auto Differential; Future  -     TSH With Reflex Ft4; Future  Panlobular emphysema (HCC)  -     Lipid, Fasting; Future  -     Comprehensive Metabolic Panel; Future  -     Hepatitis C Antibody; Future  -     CBC with Auto Differential; Future  -     TSH With Reflex Ft4; Future  Personal history of tobacco use, presenting hazards to health  Hyperlipidemia, unspecified hyperlipidemia type  -     Lipid, Fasting; Future  Menopause  -     Vitamin D 25 Hydroxy; Future  Other fatigue  -     Vitamin B12; Future  Muscle spasm  -     Magnesium; Future  Abnormal findings on diagnostic imaging of other specified body structures  -     TSH With Reflex Ft4; Future  Body mass index (BMI) 30.0-30.9, adult  -     Vitamin D 25 Hydroxy; Future  Personal history of tobacco use  -     NE VISIT TO DISCUSS LUNG CA SCREEN W LDCT  -     CT Lung Screen (Initial/Annual/Baseline); Future    Recommendations for Preventive Services Due: see orders and patient instructions/AVS.  Recommended screening schedule for the next 5-10 years is provided to the patient in written form: see Patient Instructions/AVS.     No follow-ups on file. Subjective   The following acute and/or chronic problems were also addressed today:  Insomnia since stroke. She has multiple days a week she cannot sleep for.      Patient's complete Health Risk Assessment and screening values have been
Detail Level: Generalized
Detail Level: Zone
Detail Level: Simple

## 2023-09-11 NOTE — PATIENT INSTRUCTIONS
swimming. Always wear a seat belt when traveling in a car. Always wear a helmet when riding a bicycle or motorcycle.

## 2023-09-13 LAB
ALBUMIN SERPL-MCNC: NORMAL G/DL
ALP BLD-CCNC: NORMAL U/L
ALT SERPL-CCNC: NORMAL U/L
ANION GAP SERPL CALCULATED.3IONS-SCNC: NORMAL MMOL/L
AST SERPL-CCNC: NORMAL U/L
BASOPHILS ABSOLUTE: NORMAL
BASOPHILS RELATIVE PERCENT: NORMAL
BILIRUB SERPL-MCNC: NORMAL MG/DL
BUN BLDV-MCNC: NORMAL MG/DL
CALCIUM SERPL-MCNC: NORMAL MG/DL
CHLORIDE BLD-SCNC: NORMAL MMOL/L
CHOLESTEROL, FASTING: 276
CO2: NORMAL
CREAT SERPL-MCNC: NORMAL MG/DL
EGFR: NORMAL
EOSINOPHILS ABSOLUTE: NORMAL
EOSINOPHILS RELATIVE PERCENT: NORMAL
GLUCOSE BLD-MCNC: 93 MG/DL
HCT VFR BLD CALC: NORMAL %
HDLC SERPL-MCNC: 45 MG/DL (ref 35–70)
HEMOGLOBIN: NORMAL
LDL CHOLESTEROL CALCULATED: 189 MG/DL (ref 0–160)
LYMPHOCYTES ABSOLUTE: NORMAL
LYMPHOCYTES RELATIVE PERCENT: NORMAL
MAGNESIUM: NORMAL
MCH RBC QN AUTO: NORMAL PG
MCHC RBC AUTO-ENTMCNC: NORMAL G/DL
MCV RBC AUTO: NORMAL FL
MONOCYTES ABSOLUTE: NORMAL
MONOCYTES RELATIVE PERCENT: NORMAL
NEUTROPHILS ABSOLUTE: NORMAL
NEUTROPHILS RELATIVE PERCENT: NORMAL
PDW BLD-RTO: NORMAL %
PLATELET # BLD: NORMAL 10*3/UL
PMV BLD AUTO: NORMAL FL
POTASSIUM SERPL-SCNC: NORMAL MMOL/L
RBC # BLD: NORMAL 10*6/UL
SODIUM BLD-SCNC: NORMAL MMOL/L
TOTAL PROTEIN: NORMAL
TRIGLYCERIDE, FASTING: 313
WBC # BLD: NORMAL 10*3/UL

## 2023-09-15 DIAGNOSIS — I25.83 CORONARY ARTERY DISEASE DUE TO LIPID RICH PLAQUE: ICD-10-CM

## 2023-09-15 DIAGNOSIS — E78.5 HYPERLIPIDEMIA, UNSPECIFIED HYPERLIPIDEMIA TYPE: ICD-10-CM

## 2023-09-15 DIAGNOSIS — I25.10 CORONARY ARTERY DISEASE DUE TO LIPID RICH PLAQUE: ICD-10-CM

## 2023-09-15 DIAGNOSIS — J43.1 PANLOBULAR EMPHYSEMA (HCC): ICD-10-CM

## 2023-09-15 DIAGNOSIS — M62.838 MUSCLE SPASM: ICD-10-CM

## 2023-09-15 NOTE — RESULT ENCOUNTER NOTE
Call and advise patient that the results showed high cholesterol. Please make sure patient is taking atorvastatin. If she is not currently taking then recommend she begin this. If she is taking then recommend we increase the dose.

## 2023-09-20 DIAGNOSIS — Z78.0 MENOPAUSE: ICD-10-CM

## 2023-09-25 DIAGNOSIS — Z87.891 PERSONAL HISTORY OF TOBACCO USE: ICD-10-CM

## 2023-09-25 NOTE — RESULT ENCOUNTER NOTE
Call patient and advise that test results show stable lung nodules, stable emphysema, and stable enlargement of aorta. No acute change continue with yearly screens.

## 2023-10-27 ENCOUNTER — TELEPHONE (OUTPATIENT)
Dept: PRIMARY CARE CLINIC | Age: 73
End: 2023-10-27

## 2023-10-27 NOTE — TELEPHONE ENCOUNTER
Patient is requesting a change of medications. The Trazodone is not helping her sleep. When she started taking the lansoprazole she started having hives. Pharmacy confirmed. Please advise.

## 2023-10-30 NOTE — TELEPHONE ENCOUNTER
Patient states she was on Omeprazole previously and this helped. Would like prescription for this instead of taking OTC Pepcid. BG Stephani.

## 2023-10-30 NOTE — TELEPHONE ENCOUNTER
This was stopped by cardiology due to interaction with her plavix please have patient clear this by cardiology before starting.

## 2023-10-30 NOTE — TELEPHONE ENCOUNTER
Patient advised to take two tablets trazodone to help with sleep and to take OTC Pepcid twice daily. She has stopped lansoprazole.

## 2023-10-30 NOTE — TELEPHONE ENCOUNTER
Have patient increase the trazodone to two tablets daily stop lansoprazole. Follow up in office if still not having benefit from medications.

## 2023-10-30 NOTE — TELEPHONE ENCOUNTER
Called pt back. Pt stated that she will be looking for another provider. Does not have anything wrong with her heart.

## 2023-11-08 DIAGNOSIS — I10 ESSENTIAL (PRIMARY) HYPERTENSION: ICD-10-CM

## 2023-11-08 RX ORDER — AMLODIPINE BESYLATE 10 MG/1
TABLET ORAL
Qty: 90 TABLET | Refills: 3 | Status: SHIPPED | OUTPATIENT
Start: 2023-11-08

## 2023-11-08 NOTE — TELEPHONE ENCOUNTER
LAST VISIT:   9/11/2023     Future Appointments   Date Time Provider [address]   3/11/2024  2:30 PM Tc Bain

## 2024-02-15 DIAGNOSIS — J45.20 MILD INTERMITTENT ASTHMA, UNSPECIFIED WHETHER COMPLICATED: ICD-10-CM

## 2024-02-16 RX ORDER — ALBUTEROL SULFATE 90 UG/1
AEROSOL, METERED RESPIRATORY (INHALATION)
Qty: 25.5 G | Refills: 0 | Status: SHIPPED | OUTPATIENT
Start: 2024-02-16

## 2024-04-30 DIAGNOSIS — J45.20 MILD INTERMITTENT ASTHMA, UNSPECIFIED WHETHER COMPLICATED: ICD-10-CM

## 2024-04-30 RX ORDER — ALBUTEROL SULFATE 90 UG/1
AEROSOL, METERED RESPIRATORY (INHALATION)
Qty: 25.5 G | Refills: 3 | Status: SHIPPED | OUTPATIENT
Start: 2024-04-30

## 2024-07-23 RX ORDER — CLOPIDOGREL BISULFATE 75 MG/1
75 TABLET ORAL DAILY
Qty: 90 TABLET | Refills: 3 | OUTPATIENT
Start: 2024-07-23

## 2024-07-23 NOTE — TELEPHONE ENCOUNTER
Patient states Contreras is no longer her provider.  She has a new doctor.  I have removed Contreras as PCP and asked patient to let pharmacy know to not send refill requests to our office.  Voices understanding.

## 2024-10-30 ENCOUNTER — TELEPHONE (OUTPATIENT)
Dept: PRIMARY CARE CLINIC | Age: 74
End: 2024-10-30

## 2024-10-30 NOTE — TELEPHONE ENCOUNTER
Writer spoke with pt.  She is currently with a new provider that was closer to her current location.

## 2024-12-13 LAB
ANION GAP SERPL CALCULATED.3IONS-SCNC: NORMAL MMOL/L
BUN / CREAT RATIO: NORMAL
BUN BLDV-MCNC: NORMAL MG/DL
CALCIUM SERPL-MCNC: NORMAL MG/DL
CHLORIDE BLD-SCNC: NORMAL MMOL/L
CHOLESTEROL, TOTAL: 201 MG/DL
CHOLESTEROL/HDL RATIO: 3.3
CO2: NORMAL
CREAT SERPL-MCNC: NORMAL MG/DL
GFR AFRICAN AMERICAN: >60
GFR NON-AFRICAN AMERICAN: >60
GLUCOSE FASTING: 99 MG/DL
HDLC SERPL-MCNC: 61 MG/DL (ref 35–70)
LDL CHOLESTEROL: 107
NONHDLC SERPL-MCNC: 140 MG/DL
POTASSIUM SERPL-SCNC: NORMAL MMOL/L
SODIUM BLD-SCNC: NORMAL MMOL/L
TRIGL SERPL-MCNC: 165 MG/DL
VLDLC SERPL CALC-MCNC: 33 MG/DL

## 2025-01-16 DIAGNOSIS — I10 ESSENTIAL (PRIMARY) HYPERTENSION: ICD-10-CM

## 2025-01-17 RX ORDER — AMLODIPINE BESYLATE 10 MG/1
TABLET ORAL
Qty: 90 TABLET | Refills: 0 | Status: SHIPPED | OUTPATIENT
Start: 2025-01-17

## 2025-01-17 NOTE — TELEPHONE ENCOUNTER
LAST VISIT:   9/11/2023     No future appointments.    Pharmacy verified? Yes     [facility] - [place], [place] - [address] [place] [address] - P [phone] - F [phone] [address] [facility] [address]  Phone: [phone] Fax: [phone]

## 2025-04-04 DIAGNOSIS — I10 ESSENTIAL (PRIMARY) HYPERTENSION: ICD-10-CM

## 2025-04-07 RX ORDER — AMLODIPINE BESYLATE 10 MG/1
10 TABLET ORAL DAILY
Qty: 90 TABLET | Refills: 3 | OUTPATIENT
Start: 2025-04-07

## (undated) DEVICE — GLOVE SURG SZ 6 THK91MIL LTX FREE SYN POLYISOPRENE ANTI

## (undated) DEVICE — CLEARCUT® SIDEPORT KNIFE DUAL BEVEL 1.0MM ANGLED: Brand: CLEARCUT®

## (undated) DEVICE — PACK PROC FLD MGMT SYS CENTURION CUST

## (undated) DEVICE — MHPB HEAD AND NECK  PACK: Brand: MEDLINE INDUSTRIES, INC.

## (undated) DEVICE — SOLUTION SURG PREP POV IOD 7.5% 4 OZ

## (undated) DEVICE — COVER,MAYO STAND,STERILE: Brand: MEDLINE

## (undated) DEVICE — SUTURE NONABSORBABLE MONOFILAMENT 4-0 PS-2 18 IN BLU PROLENE 8682H

## (undated) DEVICE — GOWN,AURORA,NONREINFORCED,LARGE: Brand: MEDLINE

## (undated) DEVICE — ALCON INTREPID 0.3MM POLYMER I/A COAXIAL CONICAL ANGLED: Brand: ALCON, INTREPID

## (undated) DEVICE — SOLUTION PREP POVIDONE IOD FOR SKIN MUCOUS MEM PRIOR TO

## (undated) DEVICE — LABEL MED EYE STRL

## (undated) DEVICE — SHIELD EYE PLAS CATRCT PT CARE

## (undated) DEVICE — SURGICAL PROCEDURE PACK LT EYE CUST ST CHARLES STRL LF DISP

## (undated) DEVICE — SOLUTION IRRIGATION BAL SALT SOLUTION 500 ML STRL BSS

## (undated) DEVICE — GAUZE,SPONGE,4"X4",16PLY,XRAY,STRL,LF: Brand: MEDLINE

## (undated) DEVICE — TOWEL,OR,DSP,ST,WHITE,DLX,2/PK,40PK/CS: Brand: MEDLINE

## (undated) DEVICE — SOLUTION IV IRRIG POUR BRL 0.9% SODIUM CHL 2F7124

## (undated) DEVICE — MARKER,SKIN,WI/RULER AND LABELS: Brand: MEDLINE

## (undated) DEVICE — SUTURE VCRL + SZ 3-0 L18IN ABSRB UD PS-2 3/8 CIR REV CUT VCP497H

## (undated) DEVICE — SUTURE VCRL SZ 4-0 L18IN ABSRB UD L19MM PS-2 3/8 CIR PRIM J496H

## (undated) DEVICE — GLOVE SURG SZ 6 L12IN THK75MIL DK GRN LTX FREE

## (undated) DEVICE — THE MONARCH® "D" CARTRIDGE IS A SINGLE-USE POLYPROPYLENE CARTRIDGE FOR POSTERIOR CHAMBER IOL DELIVERY: Brand: MONARCH® III

## (undated) DEVICE — SOLUTION PREP PAINT POV IOD FOR SKIN MUCOUS MEM

## (undated) DEVICE — PREMIUM DRY TRAY LF: Brand: MEDLINE INDUSTRIES, INC.

## (undated) DEVICE — Z DISCONTINUED BY MEDLINE USE 2711682 TRAY SKIN PREP DRY W/ PREM GLV

## (undated) DEVICE — ELECTRODE ELECSURG NDL 2.8 INX7.2 CM COAT INSUL EDGE

## (undated) DEVICE — MASTISOL ADHESIVE AMPULE

## (undated) DEVICE — INTENDED FOR TISSUE SEPARATION, AND OTHER PROCEDURES THAT REQUIRE A SHARP SURGICAL BLADE TO PUNCTURE OR CUT.: Brand: BARD-PARKER ® SAFETYLOCK CARBON RIB-BACK BLADES

## (undated) DEVICE — STRIP,CLOSURE,WOUND,MEDI-STRIP,1/2X4: Brand: MEDLINE

## (undated) DEVICE — STANDARD HYPODERMIC NEEDLE,POLYPROPYLENE HUB: Brand: MONOJECT

## (undated) DEVICE — SUTURE PROL SZ 6-0 L18IN NONABSORBABLE BLU L16MM PC-3 3/8 8636G

## (undated) DEVICE — ELECTRODE PT RET AD L9FT HI MOIST COND ADH HYDRGEL CORDED

## (undated) DEVICE — PENCIL ES L3M BTTN SWCH HOLSTER W/ BLDE ELECTRD EDGE

## (undated) DEVICE — SYRINGE MED 10ML LUERLOCK TIP W/O SFTY DISP